# Patient Record
Sex: FEMALE | Race: BLACK OR AFRICAN AMERICAN | Employment: OTHER | URBAN - METROPOLITAN AREA
[De-identification: names, ages, dates, MRNs, and addresses within clinical notes are randomized per-mention and may not be internally consistent; named-entity substitution may affect disease eponyms.]

---

## 2017-01-11 ENCOUNTER — DOCUMENTATION ONLY (OUTPATIENT)
Dept: HEMATOLOGY | Age: 62
End: 2017-01-11

## 2017-01-11 ENCOUNTER — TELEPHONE (OUTPATIENT)
Dept: HEMATOLOGY | Age: 62
End: 2017-01-11

## 2017-01-11 NOTE — TELEPHONE ENCOUNTER
Ms. Jose M Hernandez would like to discuss her medical issues with you, she will not be at her 7:30am appointment today, due to the weather.  She would like to speak to you then she will schedule an f/up apt

## 2017-01-11 NOTE — PROGRESS NOTES
Called and informed Ms. Zia Kameronlucille that she needs to schedule a f/up apt to discuss her medical issues.

## 2017-02-08 ENCOUNTER — OFFICE VISIT (OUTPATIENT)
Dept: HEMATOLOGY | Age: 62
End: 2017-02-08

## 2017-02-08 VITALS
RESPIRATION RATE: 16 BRPM | OXYGEN SATURATION: 97 % | WEIGHT: 153 LBS | HEIGHT: 62 IN | HEART RATE: 58 BPM | SYSTOLIC BLOOD PRESSURE: 153 MMHG | TEMPERATURE: 99 F | DIASTOLIC BLOOD PRESSURE: 96 MMHG | BODY MASS INDEX: 28.16 KG/M2

## 2017-02-08 DIAGNOSIS — B18.2 CHRONIC HEPATITIS C WITHOUT HEPATIC COMA (HCC): Primary | ICD-10-CM

## 2017-02-08 DIAGNOSIS — B18.2 CHRONIC HEPATITIS C WITHOUT HEPATIC COMA (HCC): ICD-10-CM

## 2017-02-08 NOTE — MR AVS SNAPSHOT
Visit Information Date & Time Provider Department Dept. Phone Encounter #  
 2/8/2017  3:40 PM Cassidy Chapman MD [facility] [facility] [id number] Your Appointments 3/9/2017  9:00 AM  
PROCEDURE with Cassidy Chapman MD  
[facility] ([facility]) Appt Note: EGD ---- HCV  
 [address] [facility] [address]  
[phone]  
  
   
 Ruben Haley  
  
    
 3/16/2017  3:00 PM  
Follow Up with Delphine Felty, NP [facility] of [facility] ([facility]) Appt Note: HCV  
 [address] Upcoming Health Maintenance Date Due Pneumococcal 19-64 Highest Risk (1 of 3 - PCV13) 5/18/1974 DTaP/Tdap/Td series (1 - Tdap) 5/18/1976 PAP AKA CERVICAL CYTOLOGY 5/18/1976 BREAST CANCER SCRN MAMMOGRAM 5/18/2005 FOBT Q 1 YEAR AGE 50-75 5/18/2005 ZOSTER VACCINE AGE 60> 5/18/2015 INFLUENZA AGE 9 TO ADULT 8/1/2016 Allergies as of 2/8/2017  Review Complete On: 2/8/2017 By: Cora Patel Severity Noted Reaction Type Reactions Lipitor [Atorvastatin]  06/29/2016    Swelling Current Immunizations  Never Reviewed No immunizations on file. Not reviewed this visit You Were Diagnosed With   
  
 Codes Comments Chronic hepatitis C without hepatic coma (HCC)    -  Primary ICD-10-CM: B18.2 ICD-9-CM: 070.54 Vitals BP Pulse Temp Resp Height(growth percentile) Weight(growth percentile) (!) 153/96 (BP 1 Location: Left arm, BP Patient Position: Sitting) (!) 58 99 °F (37.2 °C) (Tympanic) 16 5' 2\" (1.575 m) 153 lb (69.4 kg) SpO2 BMI OB Status Smoking Status 97% 27.98 kg/m2 Menopause Current Every Day Smoker Vitals History BMI and BSA Data Body Mass Index Body Surface Area  
 27.98 kg/m 2 1.74 m 2 Your Updated Medication List  
  
   
This list is accurate as of: 2/8/17  4:21 PM.  Always use your most recent med list.  
  
  
  
  
 hydroCHLOROthiazide 25 mg tablet Commonly known as:  HYDRODIURIL Take 25 mg by mouth daily. PAXIL 10 mg tablet Generic drug:  PARoxetine Take  by mouth daily. To-Do List   
 02/08/2017 Lab:  AFP WITH AFP-L3%   
  
 02/08/2017 Lab:  CANCER AG 19-9   
  
 02/08/2017 Lab:  CBC WITH AUTOMATED DIFF   
  
 02/08/2017 Lab:  ETHYL ALCOHOL   
  
 02/08/2017 Lab:  HCV RNA BY BRONWYN QL,RFLX TO QT   
  
 02/08/2017 Lab:  HEP A AB, TOTAL   
  
 02/08/2017 Lab:  HEP B SURFACE AB   
  
 02/08/2017 Lab:  HEP B SURFACE AG   
  
 02/08/2017 Lab:  HEP C GENOTYPE   
  
 02/08/2017 Lab:  HEPATIC FUNCTION PANEL   
  
 02/08/2017 Lab:  HEPATITIS B CORE AB, TOTAL   
  
 02/08/2017 Lab:  HIV 1/2 AG/AB, 4TH GENERATION,W RFLX CONFIRM   
  
 02/08/2017 Lab:  METABOLIC PANEL, BASIC   
  
 02/08/2017 Lab:  PROTHROMBIN TIME + INR   
  
 03/10/2017 GI:  EGD   
  
 03/10/2017 Imaging:  MRI ABD W WO CONT Introducing [facility] & HEALTH SERVICES! Mariel Whitley introduces AppNeta patient portal. Now you can access parts of your medical record, email your doctor's office, and request medication refills online. 1. In your internet browser, go to [email or web address] 2. Click on the First Time User? Click Here link in the Sign In box. You will see the New Member Sign Up page. 3. Enter your AppNeta Access Code exactly as it appears below. You will not need to use this code after youve completed the sign-up process. If you do not sign up before the expiration date, you must request a new code. · AppNeta Access Code: [email or web address] Expires: 5/9/2017  4:21 PM 
 
4.  Enter the last four digits of your Social Security Number (xxxx) and Date of Birth (mm/dd/yyyy) as indicated and click Submit. You will be taken to the next sign-up page. 5. Create a Reclog ID. This will be your Reclog login ID and cannot be changed, so think of one that is secure and easy to remember. 6. Create a Reclog password. You can change your password at any time. 7. Enter your Password Reset Question and Answer. This can be used at a later time if you forget your password. 8. Enter your e-mail address. You will receive e-mail notification when new information is available in [address] 9. Click Sign Up. You can now view and download portions of your medical record. 10. Click the Download Summary menu link to download a portable copy of your medical information. If you have questions, please visit the Frequently Asked Questions section of the Reclog website. Remember, Reclog is NOT to be used for urgent needs. For medical emergencies, dial 911. Now available from your iPhone and Android! Please provide this summary of care documentation to your next provider. Your primary care clinician is listed as [employer]. If you have any questions after today's visit, please call [phone].

## 2017-02-08 NOTE — PROGRESS NOTES
93 Danie Davenport MD, Chyrel Goltz, NP [place], PA-C Rufina Hugh, MD, [address], [facility] MD Macey Frey NP Lendia Oiler, NP [facility]     [address], [address] [facility]     [facility], [address]     [phone]     FAX: [address] [place] [address] Drive, [address]     [place], [address] [place] [address] Street [address]     [phone]     FAX: [phone]         PCP: Patient Care Team:  Jj Patterson DO as PCP - General (Family Practice)    Problem List  Date Reviewed: 8/12/2016          Codes Class Noted    Cholangiocarcinoma ([address] ICD-10-CM: C22.1  ICD-9-CM: 155.1  6/29/2016        HTN (hypertension) ICD-10-CM: I10  ICD-9-CM: 401.9  6/15/2016        Depression ICD-10-CM: F32.9  ICD-9-CM: 311  6/15/2016        HCV (hepatitis C virus) ICD-10-CM: B19.20  ICD-9-CM: 070.70  6/15/2016        Primary malignant neoplasm of liver ([facility] [address] [address] ICD-10-CM: C22.8  ICD-9-CM: 155.0  6/15/2016                 Pippa Jama returns regarding cholangiocarcinoma vs mixed unspecified type liver cancer and its management. The patient is a 64y.o. year old [de-identified] female with history of hepatitis C with evidence of cirrhosis on biopsy 2/2016      This was biopsied and determined to be a poorly differentiated cholangiocarcinoma vs. Hep Par 1 negative HCC. There was also cirrhosis noted on this biopsy. . She was briefly treated with interferon over 15 years ago but did not tolerate it. Imaging via MRI  Feb 2016 demonstrated 5.2 x 2.8 cm cm right hepatic mass. The lesion was not classic for [address] In August of 2016 she had the tumor removed. Surgical margins were clear. Gallbladder was also removed. She is doing well and is ready to treat HCV. She was lost to follow up post operatively.  She then went to see a GI closer to home to be treated for HCV and apparently was denied for uncertain reason. She does not have symptoms. The patient has not developed edema. The patient has not developed hepatic encephalopathy. The patient has not had been evaluated for varices. The most recent laboratory studies indicate that the liver transaminases are elevated, they were normal months prior. The patient has no symptoms which could be attributed to the liver disorder. The patient completes all daily activities without any functional limitations. The patient has not experienced fatigvers, chills, shortness of breath, chest pain, pain in the right side over the liver, diffuse abdominal pain, nausea, vomiting, constipation, diarrhea, dry eyes, dry mouth, arthralgias, myalgias, yellowing of the eyes or skin, itching, dark urine, problems concentrating, swelling of the abdomen, swelling of the lower extremities, hematemesis, hematochezia. ALLERGIES  Allergies   Allergen Reactions    Lipitor [Atorvastatin] Swelling       MEDICATIONS  Current Outpatient Prescriptions   Medication Sig    PARoxetine (PAXIL) 10 mg tablet Take  by mouth daily.  hydrochlorothiazide (HYDRODIURIL) 25 mg tablet Take 25 mg by mouth daily. No current facility-administered medications for this visit. SYSTEM REVIEW NOT RELATED TO LIVER DISEASE OR REVIEWED ABOVE:  Constitution systems: Negative for fever, chills, weight gain, weight loss. Eyes: Negative for visual changes. ENT: Negative for sore throat, painful swallowing. Respiratory: Negative for cough, hemoptysis, SOB. Cardiology: Negative for chest pain, palpitations. GI:  Negative for constipation or diarrhea. : Negative for urinary frequency, dysuria, hematuria, nocturia. Skin: Negative for rash. Hematology: Negative for easy bruising, blood clots. Musculo-skelatal: Negative for back pain, muscle pain, weakness.   Neurologic: Negative for headaches, dizziness, vertigo, memory problems not related to HE.  Psychology: Negative for anxiety, depression. FAMILY HISTORY:  The father Passed away. No knowledge of medical histry  The mother passed from kidney disease. There is no family history of liver disease. SOCIAL HISTORY:  The patient is . The patient has 5 children. The patient smokes 3-4 cigarettes/day. The patient is retired. She does get social security. PHYSICAL EXAMINATION:  Visit Vitals    BP (!) 153/96 (BP 1 Location: Left arm, BP Patient Position: Sitting)    Pulse (!) 58    Temp 99 °F (37.2 °C) (Tympanic)    Resp 16    Ht 5' 2\" (1.575 m)    Wt 153 lb (69.4 kg)    SpO2 97%    BMI 27.98 kg/m2     General: No acute distress. Eyes: Sclera anicteric. ENT: No oral lesions. Thyroid normal.  Nodes: No adenopathy. Skin: No spider angiomata. No jaundice. No palmar erythema. Respiratory: Lungs clear to auscultation. Cardiovascular: Regular heart rate. No murmurs. No JVD. Abdomen: Soft non-tender. Right subcostal scar. Well healed. Liver size normal to percussion/palpation. Spleen not palpable. No obvious ascites. Extremities: No edema. No muscle wasting. No gross arthritic changes. Neurologic: Alert and oriented. Cranial nerves grossly intact. No asterixis.     LABORATORY STUDIES:  [facility] Ref Rng 6/15/2016 1/28/2016   WBC 3.4 - 10.8 x10E3/uL 7.9 7.8   ANC 1.4 - 7.0 x10E3/uL 4.2    HGB 11.1 - 15.9 g/dL 14.5 12.6    - 379 x10E3/uL 190 184   INR 0.8 - 1.2 1.1 0.9   AST 0 - 40 IU/L 50 (H)    ALT 0 - 32 IU/L 59 (H)    Alk Phos 39 - 117 IU/L 82    Bili, Total 0.0 - 1.2 mg/dL 0.3    Bili, Direct 0.00 - 0.40 mg/dL 0.09    Albumin 3.6 - 4.8 g/dL 4.0    BUN 8 - 27 mg/dL 12 14   Creat 0.57 - 1.00 mg/dL 1.05 (H) 0.88   Na 134 - 144 mmol/L 140 142   K 3.5 - 5.2 mmol/L 3.9 4.1   Cl 97 - 108 mmol/L 102 108   CO2 18 - 29 mmol/L 24 30   Glucose 65 - 99 mg/dL 77 88   Ammonia 11 - 32 UMOL/L 21      From 11/2015  AST/ALT/ALP/T Bili/ALB: 34/33/74/0.3/3.8  WBC/HB/PLT/INR:  BUN/CREAT:      Recent liver function panel, CBC with platelet count and BMP are not available. These studies will be performed. SEROLOGIES:  HCV RNA + GT 1a      LIVER HISTOLOGY:  02/2016 Liver biopsy POORLY DIFFERENTIATED CHOLANGIOCARCINOMA    ENDOSCOPIC PROCEDURES:  Not available or performed    RADIOLOGY:  02/2016 MRI  Inferior right hepatic lobe mass with imaging features that are not entirely  specific. This is a hypovascular lesion with intrahepatic cholangiocarcinoma and  hypovascular metastasis as primary differential possibilities. This lesion was  biopsied previously on 1/28/2016.   2. Prominent cardiophrenic lymph node as well as damari hepatic lymph nodes which  are nonspecific but potentially metastatic. OTHER TESTING:  Not available or performed    ASSESSMENT AND PLAN:    Chronic Hepatitis C cirrhosis complicated by  poorly differentiated intrahepatic cholangiocarcinoma. She is s/p hepatic wedge resection of the tumor in 8/2016. Follow up imaging has not been performed as patient was lost to follow up with me. Will repeat MRI today and follow her tumor makers. We will repeat imaging every 3 months for at least a year. Then we will extend to Q6 months. Patient does have cirrhosis mentioned on biopsy though her liver function appears well preserved. HCV treatment should be pursued now given her excellent surgical outcome. I am not clear why she was denied before. EGD for varices screening should be planned. I scheduled this. All of the above issues were discussed with the patient. All questions were answered. The patient expressed a clear understanding of the above. [facility] 87 in 4 weeks to begin HCV treatment.         Leah Villafuerte MD  [facility] [place] of [address], [address], [address]  [phone]  FAX: [phone]

## 2017-02-17 ENCOUNTER — HOSPITAL ENCOUNTER (OUTPATIENT)
Dept: LAB | Age: 62
Discharge: HOME | End: 2017-02-17

## 2017-02-17 PROCEDURE — 99001 SPECIMEN HANDLING PT-LAB: CPT | Performed by: INTERNAL MEDICINE

## 2017-02-22 ENCOUNTER — HOSPITAL ENCOUNTER (OUTPATIENT)
Age: 62
Discharge: HOME | End: 2017-02-22
Attending: INTERNAL MEDICINE
Payer: MEDICAID

## 2017-02-22 DIAGNOSIS — B18.2 CHRONIC HEPATITIS C WITHOUT HEPATIC COMA (HCC): ICD-10-CM

## 2017-02-22 LAB
AFP L3 MFR SERPL: 6.1 % (ref 0–9.9)
AFP SERPL-MCNC: 5.2 NG/ML (ref 0–8)
ALBUMIN SERPL-MCNC: 3.8 G/DL (ref 3.6–4.8)
ALP SERPL-CCNC: 88 IU/L (ref 39–117)
ALT SERPL-CCNC: 32 IU/L (ref 0–32)
AST SERPL-CCNC: 32 IU/L (ref 0–40)
BASOPHILS # BLD AUTO: 0 X10E3/UL (ref 0–0.2)
BASOPHILS NFR BLD AUTO: 0 %
BILIRUB DIRECT SERPL-MCNC: 0.11 MG/DL (ref 0–0.4)
BILIRUB SERPL-MCNC: 0.3 MG/DL (ref 0–1.2)
BUN SERPL-MCNC: 14 MG/DL (ref 8–27)
BUN/CREAT SERPL: 14 (ref 11–26)
CALCIUM SERPL-MCNC: 9.5 MG/DL (ref 8.7–10.3)
CANCER AG19-9 SERPL-ACNC: 22 U/ML (ref 0–35)
CHLORIDE SERPL-SCNC: 101 MMOL/L (ref 96–106)
CO2 SERPL-SCNC: 24 MMOL/L (ref 18–29)
CREAT SERPL-MCNC: 1.01 MG/DL (ref 0.57–1)
EOSINOPHIL # BLD AUTO: 0.1 X10E3/UL (ref 0–0.4)
EOSINOPHIL NFR BLD AUTO: 2 %
ERYTHROCYTE [DISTWIDTH] IN BLOOD BY AUTOMATED COUNT: 14 % (ref 12.3–15.4)
ETHANOL BLD GC-MCNC: NEGATIVE %
GLUCOSE SERPL-MCNC: 74 MG/DL (ref 65–99)
HAV AB SER QL IA: NEGATIVE
HBV CORE AB SERPL QL IA: POSITIVE
HBV SURFACE AB SER QL: REACTIVE
HBV SURFACE AG SERPL QL IA: NEGATIVE
HCT VFR BLD AUTO: 42.1 % (ref 34–46.6)
HCV GENTYP SERPL NAA+PROBE: NORMAL
HCV RNA SERPL NAA+PROBE-ACNC: NORMAL IU/ML
HCV RNA SERPL NAA+PROBE-LOG IU: 6.87 LOG10 IU/ML
HCV RNA SERPL QL NAA+PROBE: POSITIVE
HGB BLD-MCNC: 13.6 G/DL (ref 11.1–15.9)
HIV 1+2 AB+HIV1 P24 AG SERPL QL IA: NON REACTIVE
IMM GRANULOCYTES # BLD: 0 X10E3/UL (ref 0–0.1)
IMM GRANULOCYTES NFR BLD: 0 %
INR PPP: 1 (ref 0.8–1.2)
LYMPHOCYTES # BLD AUTO: 2.5 X10E3/UL (ref 0.7–3.1)
LYMPHOCYTES NFR BLD AUTO: 33 %
MCH RBC QN AUTO: 30.5 PG (ref 26.6–33)
MCHC RBC AUTO-ENTMCNC: 32.3 G/DL (ref 31.5–35.7)
MCV RBC AUTO: 94 FL (ref 79–97)
MONOCYTES # BLD AUTO: 0.5 X10E3/UL (ref 0.1–0.9)
MONOCYTES NFR BLD AUTO: 7 %
NEUTROPHILS # BLD AUTO: 4.4 X10E3/UL (ref 1.4–7)
NEUTROPHILS NFR BLD AUTO: 58 %
PLATELET # BLD AUTO: 200 X10E3/UL (ref 150–379)
PLEASE NOTE, 550474: NORMAL
POTASSIUM SERPL-SCNC: 3.7 MMOL/L (ref 3.5–5.2)
PROT SERPL-MCNC: 7.8 G/DL (ref 6–8.5)
PROTHROMBIN TIME: 10.5 SEC (ref 9.1–12)
RBC # BLD AUTO: 4.46 X10E6/UL (ref 3.77–5.28)
SODIUM SERPL-SCNC: 140 MMOL/L (ref 134–144)
TEST INFORMATION: NORMAL
WBC # BLD AUTO: 7.6 X10E3/UL (ref 3.4–10.8)

## 2017-02-22 PROCEDURE — 74011250636 HC RX REV CODE- 250/636: Performed by: INTERNAL MEDICINE

## 2017-02-22 PROCEDURE — A9585 GADOBUTROL INJECTION: HCPCS | Performed by: INTERNAL MEDICINE

## 2017-02-22 PROCEDURE — 74183 MRI ABD W/O CNTR FLWD CNTR: CPT

## 2017-02-22 RX ORDER — SODIUM CHLORIDE 0.9 % (FLUSH) 0.9 %
5-10 SYRINGE (ML) INJECTION
Status: DISCONTINUED | OUTPATIENT
Start: 2017-02-22 | End: 2017-02-26 | Stop reason: HOSPADM

## 2017-02-22 RX ADMIN — GADOBUTROL 10 ML: 604.72 INJECTION INTRAVENOUS at 21:00

## 2017-02-22 RX ADMIN — Medication 10 ML: at 20:59

## 2017-03-30 ENCOUNTER — HOSPITAL ENCOUNTER (OUTPATIENT)
Age: 62
Setting detail: OUTPATIENT SURGERY
Discharge: HOME | End: 2017-03-30
Attending: INTERNAL MEDICINE | Admitting: INTERNAL MEDICINE
Payer: MEDICAID

## 2017-03-30 VITALS
SYSTOLIC BLOOD PRESSURE: 156 MMHG | OXYGEN SATURATION: 100 % | WEIGHT: 158 LBS | HEIGHT: 62 IN | TEMPERATURE: 96.9 F | HEART RATE: 62 BPM | RESPIRATION RATE: 18 BRPM | BODY MASS INDEX: 29.08 KG/M2 | DIASTOLIC BLOOD PRESSURE: 95 MMHG

## 2017-03-30 PROCEDURE — 77030009426 HC FCPS BIOP ENDOSC BSC -B: Performed by: INTERNAL MEDICINE

## 2017-03-30 PROCEDURE — 74011000250 HC RX REV CODE- 250: Performed by: INTERNAL MEDICINE

## 2017-03-30 PROCEDURE — 74011250636 HC RX REV CODE- 250/636

## 2017-03-30 PROCEDURE — 74011250636 HC RX REV CODE- 250/636: Performed by: INTERNAL MEDICINE

## 2017-03-30 PROCEDURE — 76040000019: Performed by: INTERNAL MEDICINE

## 2017-03-30 PROCEDURE — 88305 TISSUE EXAM BY PATHOLOGIST: CPT | Performed by: INTERNAL MEDICINE

## 2017-03-30 RX ORDER — MIDAZOLAM HYDROCHLORIDE 1 MG/ML
.25-5 INJECTION, SOLUTION INTRAMUSCULAR; INTRAVENOUS
Status: ACTIVE | OUTPATIENT
Start: 2017-03-30 | End: 2017-03-30

## 2017-03-30 RX ORDER — SODIUM CHLORIDE 0.9 % (FLUSH) 0.9 %
5-10 SYRINGE (ML) INJECTION AS NEEDED
Status: DISPENSED | OUTPATIENT
Start: 2017-03-30 | End: 2017-03-30

## 2017-03-30 RX ORDER — SODIUM CHLORIDE 0.9 % (FLUSH) 0.9 %
5-10 SYRINGE (ML) INJECTION EVERY 8 HOURS
Status: DISPENSED | OUTPATIENT
Start: 2017-03-30 | End: 2017-03-30

## 2017-03-30 RX ORDER — DEXTROMETHORPHAN/PSEUDOEPHED 2.5-7.5/.8
1.2 DROPS ORAL
Status: CANCELLED | OUTPATIENT
Start: 2017-03-30

## 2017-03-30 RX ORDER — FLUMAZENIL 0.1 MG/ML
0.2 INJECTION INTRAVENOUS
Status: ACTIVE | OUTPATIENT
Start: 2017-03-30 | End: 2017-03-30

## 2017-03-30 RX ORDER — DIPHENHYDRAMINE HYDROCHLORIDE 50 MG/ML
50 INJECTION, SOLUTION INTRAMUSCULAR; INTRAVENOUS ONCE
Status: CANCELLED | OUTPATIENT
Start: 2017-03-30 | End: 2017-03-30

## 2017-03-30 RX ORDER — SODIUM CHLORIDE 9 MG/ML
125 INJECTION, SOLUTION INTRAVENOUS CONTINUOUS
Status: DISPENSED | OUTPATIENT
Start: 2017-03-30 | End: 2017-03-30

## 2017-03-30 RX ORDER — NALOXONE HYDROCHLORIDE 0.4 MG/ML
0.4 INJECTION, SOLUTION INTRAMUSCULAR; INTRAVENOUS; SUBCUTANEOUS
Status: ACTIVE | OUTPATIENT
Start: 2017-03-30 | End: 2017-03-30

## 2017-03-30 RX ORDER — EPINEPHRINE 0.1 MG/ML
1 INJECTION INTRACARDIAC; INTRAVENOUS
Status: CANCELLED | OUTPATIENT
Start: 2017-03-30 | End: 2017-03-30

## 2017-03-30 RX ORDER — FENTANYL CITRATE 50 UG/ML
100 INJECTION, SOLUTION INTRAMUSCULAR; INTRAVENOUS
Status: ACTIVE | OUTPATIENT
Start: 2017-03-30 | End: 2017-03-30

## 2017-03-30 RX ORDER — ATROPINE SULFATE 0.1 MG/ML
0.5 INJECTION INTRAVENOUS
Status: CANCELLED | OUTPATIENT
Start: 2017-03-30 | End: 2017-03-30

## 2017-03-30 RX ORDER — LANOLIN ALCOHOL/MO/W.PET/CERES
3 CREAM (GRAM) TOPICAL
COMMUNITY
End: 2017-04-20

## 2017-03-30 RX ADMIN — SODIUM CHLORIDE 125 ML/HR: 900 INJECTION, SOLUTION INTRAVENOUS at 09:42

## 2017-03-30 NOTE — PROCEDURES
UPPER ENDOSCOPY PROCEDURE NOTE    Feliz Sykes  1955    INDICATION: Cirrhosis. Screening for esophageal varices with variceal ligation if  indicated. : Denton Marie MD    ASSISTANT: NONE    ANESTHESIA/SEDATION: Versed 3.5 mg and Fentanyl 75 mcg    PROCEDURE DESCRIPTION:  Infomed consent was obtained from the patient for the procedure. All risks and benefits of the procedure explained. The patient was taken to the endoscopy suite and placed in the left lateral decubitus position. Following sequential administration of sedation to doses as indicated above the endoscope was inserted into the mouth and advanced under direct vision to the second portion of the duodenum. Careful inspection of upper gastrointestinal tract was made as the endoscope was inserted and withdrawn. Retroflexion of the endoscope to view of the cardia of the stomach was performed. The findings and interventions are described below. FINDINGS:  Esophagus:  Normal.      Stomach: Mild gastritis. This was biopsied at random. No gastric varices identified. Duodenum: Normal bulb and second portion    INTERVENTION: Biopsies of stomach taken. COMPLICATIONS: None. The patient tolerated the procedure well. EBL: Negligible. RECOMMENDATIONS:  Observe until discharge parameters are achieved. May resume previous diet. Repeat endoscopy to reassess varices and need for banding in 2 years. Follow-up Liver [facility] office as scheduled.       Denton Marie MD  3/30/2017  10:40 AM

## 2017-03-30 NOTE — IP AVS SNAPSHOT
[address] [place] [address] 
[phone] Patient: Arturo Varghese MRN: [id number] RFZ:1/69/[id number] You are allergic to the following Allergen Reactions Lipitor (Atorvastatin) Swelling Recent Documentation Height Weight Breastfeeding? BMI OB Status Smoking Status 1.575 m 71.7 kg No 28.9 kg/m2 Menopause Current Every Day Smoker Emergency Contacts Name Discharge Info Relation Home Work Mobile [facility] DISCHARGE CAREGIVER [3] Spouse [3] [phone] About your hospitalization You were admitted on:  March 30, 2017 You last received care in the:  [facility] ENDOSCOPY You were discharged on:  March 30, 2017 Unit phone number:  [phone] Why you were hospitalized Your primary diagnosis was:  Not on File Providers Seen During Your Hospitalizations Provider Role Specialty Primary office phone Micky Saunders MD Attending Provider Gastroenterology [phone] Your Primary Care Physician (PCP) Primary Care Physician Office Phone Office Fax Ming Gutierres [phone] Follow-up Information Follow up With Details Comments Contact Info Urszula Santiago, 7[address] [facility] 207 200 [facility] 
[phone] Micky Saunders MD  as intructed. 217 [facility] [address] 
[phone] Your Appointments Thursday April 20, 2017  2:00 PM EDT Follow Up with Beti Lizama NP [facility] ([facility])  
 [address] [place] [address]  
[phone] Current Discharge Medication List  
  
CONTINUE these medications which have NOT CHANGED Dose & Instructions Dispensing Information Comments Morning Noon Evening Bedtime  
 hydroCHLOROthiazide 25 mg tablet Commonly known as:  HYDRODIURIL Your last dose was: Your next dose is:    
   
   
 Dose:  25 mg Take 25 mg by mouth daily. Refills:  0  
     
   
   
   
  
 melatonin 3 mg tablet Your last dose was: Your next dose is:    
   
   
 Dose:  3 mg Take 3 mg by mouth. Refills:  0 PAXIL 10 mg tablet Generic drug:  PARoxetine Your last dose was: Your next dose is: Take  by mouth daily. Refills:  0 Discharge Instructions [address] Aide Recinos MD, SAGE Vicente PA-C Tylene Port, MD, MD Macey Clinton NP Kimberlee Poe, NP [address] [place] [address], [address] [place]. 
   [phone] FAX: [address], [address] [facility], [address] 
   [phone] FAX: [phone] ENDOSCOPY DISCHARGE INSTRUCTIONS [place] 1955 Date: 3/30/2017 DISCOMFORT: 
Use lozenges or warm salt water gargle for sore thoat Apply warm compress to IV site if red. If redness or soreness persists call the office. You may experience gas and bloating. Walking and belching will help relieve this. You may experience chest discomfort or pressure especially if banding of esophageal varices was performed. DIET: 
You may resume your normal diet. ACTIVITY: 
Spend the remainder of the day resting. Avoid any strenuous activity. You may not operate a vehicle for 12 hours. You may not engage in an occupation involving machinery or appliances for rest of today. Avoid making any critical or financial decisions for at least 24 hour. Call the [employer] [phone] if you have any of the following: Increasing chest or abdominal pain, nausea, vomiting, vomiting blood, abdominal distension or swelling, fever or chills, bloody discharge from nose or mouth or shortness of breath. Follow-up Instructions: 
Call Dr. Marco Bailon for any questions or problems at the phone number listed above. If a biopsy was performed, you will be contacted by the office staff or Dr Marco Bailon within 1 week. If you have not heard from us by then you may call the office at the phone number listed above to inquire about the results. ENDOSCOPY FINDINGS: 
Your endoscopy demonstrated mild inflammation of the stomach. This was biopsied. DISCHARGE SUMMARY from the Nurse: The following personal items collected during your admission are returned to you:  
Dental Appliance: Dental Appliances: None Vision: Visual Aid: Glasses Hearing Aid:   
Jewelry:   
Clothing:   
Other Valuables:   
Valuables sent to safe:   
 
DISCHARGE SUMMARY from Nurse The following personal items are in your possession at time of discharge: 
 
Dental Appliances: None Visual Aid: Glasses PATIENT INSTRUCTIONS: 
 
 
F-face looks uneven A-arms unable to move or move unevenly S-speech slurred or non-existent T-time-call 911 as soon as signs and symptoms begin-DO NOT go Back to bed or wait to see if you get better-TIME IS BRAIN. Warning Signs of HEART ATTACK Call 911 if you have these symptoms: 
? Chest discomfort. Most heart attacks involve discomfort in the center of the chest that lasts more than a few minutes, or that goes away and comes back. It can feel like uncomfortable pressure, squeezing, fullness, or pain. ? Discomfort in other areas of the upper body. Symptoms can include pain or discomfort in one or both arms, the back, neck, jaw, or stomach. ? Shortness of breath with or without chest discomfort. ? Other signs may include breaking out in a cold sweat, nausea, or lightheadedness. Don't wait more than five minutes to call [address]! Fast action can save your life. Calling 911 is almost always the fastest way to get lifesaving treatment. Emergency Medical Services staff can begin treatment when they arrive  up to an hour sooner than if someone gets to the hospital by car. The discharge information has been reviewed with the patient and spouse. The patient and spouse verbalized understanding. Discharge medications reviewed with the patient and spouse and appropriate educational materials and side effects teaching were provided. Patient armband removed and shredded. Discharge Orders None Introducing \[facility]\"" & HEALTH SERVICES! Jorge Bonilla introduces Intervolve patient portal. Now you can access parts of your medical record, email your doctor's office, and request medication refills online. 1. In your internet browser, go to [email or web address] 2. Click on the First Time User? Click Here link in the Sign In box. You will see the New Member Sign Up page. 3. Enter your Intervolve Access Code exactly as it appears below. You will not need to use this code after youve completed the sign-up process. If you do not sign up before the expiration date, you must request a new code. · Intervolve Access Code: [email or web address] Expires: 5/9/2017  5:21 PM 
 
4. Enter the last four digits of your Social Security Number (xxxx) and Date of Birth (mm/dd/yyyy) as indicated and click Submit. You will be taken to the next sign-up page. 5. Create a DWNLDt ID. This will be your Intervolve login ID and cannot be changed, so think of one that is secure and easy to remember. 6. Create a Intervolve password. You can change your password at any time. 7. Enter your Password Reset Question and Answer.  This can be used at a later time if you forget your password. 8. Enter your e-mail address. You will receive e-mail notification when new information is available in [address] 9. Click Sign Up. You can now view and download portions of your medical record. 10. Click the Download Summary menu link to download a portable copy of your medical information. If you have questions, please visit the Frequently Asked Questions section of the Kewego website. Remember, Kewego is NOT to be used for urgent needs. For medical emergencies, dial 911. Now available from your iPhone and Android! General Information Please provide this summary of care documentation to your next provider. Patient Signature:  ____________________________________________________________ Date:  ____________________________________________________________  
  
Selvin Breath Provider Signature:  ____________________________________________________________ Date:  ____________________________________________________________

## 2017-03-30 NOTE — H&P
PRE-PROCEDURE NOTE - EGD    H and P from last office visit reviewed. Allergies reviewed. Out-patient medicaton list reviewed. Patient Active Problem List   Diagnosis Code    HTN (hypertension) I10    Depression F32.9    HCV (hepatitis C virus) B19.20    Primary malignant neoplasm of liver ([facility] C22.8    Cholangiocarcinoma ([facility] C22.1       EGD to assess for esophageal varices. The risks of the procedure were discussed with the patient. These included reaction to anesthesia, pain, perforation and bleeding. All questions were answered. The patient wishes to proceed with the procedure. PHYSICAL EXAMINATION:  VS: per nursing note  General: No acute distress. Eyes: Sclera anicteric. ENT: No oral lesions. Thyroid normal.  Nodes: No adenopathy. Skin: No spider angiomata. No jaundice. No palmar erythema. Respiratory: Lungs clear to auscultation. Cardiovascular: Regular heart rate. No murmurs. No JVD. Abdomen: Soft non-tender, liver size normal to percussion/palpation. Spleen not palpable. No obvious ascites. Extremities: No edema. No muscle wasting. No gross arthritic changes. Neurologic: Alert and oriented. Cranial nerves grossly intact. No asterixis. MOST RECENT LABORATORY STUDIES:  Lab Results   Component Value Date/Time    WBC 7.6 02/17/2017 12:00 AM    Hemoglobin (POC) 15.6 08/12/2016 11:09 AM    HGB 13.6 02/17/2017 12:00 AM    Hematocrit (POC) 46 08/12/2016 11:09 AM    HCT 42.1 02/17/2017 12:00 AM    PLATELET 352 66/88/9715 12:00 AM    MCV 94 02/17/2017 12:00 AM     Lab Results   Component Value Date/Time    INR 1.0 02/17/2017 12:00 AM    INR 1.1 06/15/2016 04:17 PM    INR 0.9 01/28/2016 07:40 AM    Prothrombin time 10.5 02/17/2017 12:00 AM    Prothrombin time 11.1 06/15/2016 04:17 PM    Prothrombin time 12.4 01/28/2016 07:40 AM       ASSESSMENT AND PLAN:  EGD to assess for esophageal varices. Conscious sedation with fentanyl and versed.           Guillermo Posey MD  Liver [place] of [address], [address], [address]  [phone]

## 2017-03-30 NOTE — DISCHARGE INSTRUCTIONS
93 Danie Davenport MD, Gian Reese, [facility]     April Juan Gaxiola, FANNY Go MD, MD Macey Gill, SAGE De Santiago, SAGE        [address] [facility], [address] Madi Sherman  22.     [phone]     FAX: [address], [address] [facility], [address]     [phone]     FAX: [phone]           ENDOSCOPY DISCHARGE INSTRUCTIONS      Estrella Zaldivar  1955  Date: 3/30/2017    DISCOMFORT:  Use lozenges or warm salt water gargle for sore thoat  Apply warm compress to IV site if red. If redness or soreness persists call the office. You may experience gas and bloating. Walking and belching will help relieve this. You may experience chest discomfort or pressure especially if banding of esophageal varices was performed. DIET:  You may resume your normal diet. ACTIVITY:  Spend the remainder of the day resting. Avoid any strenuous activity. You may not operate a vehicle for 12 hours. You may not engage in an occupation involving machinery or appliances for rest of today. Avoid making any critical or financial decisions for at least 24 hour. Call the [address] [facility] if you have any of the following:  Increasing chest or abdominal pain, nausea, vomiting, vomiting blood, abdominal distension or swelling, fever or chills, bloody discharge from nose or mouth or shortness of breath. Follow-up Instructions:  Call Dr. Catie Lim for any questions or problems at the phone number listed above. If a biopsy was performed, you will be contacted by the office staff or Dr Catie Lim within 1 week. If you have not heard from us by then you may call the office at the phone number listed above to inquire about the results.     ENDOSCOPY FINDINGS:  Your endoscopy demonstrated mild inflammation of the stomach. This was biopsied. DISCHARGE SUMMARY from the Nurse: The following personal items collected during your admission are returned to you:   Dental Appliance: Dental Appliances: None  Vision: Visual Aid: Glasses  Hearing Aid:    Jewelry:    Clothing:    Other Valuables:    Valuables sent to safe:      DISCHARGE SUMMARY from Nurse    The following personal items are in your possession at time of discharge:    Dental Appliances: None  Visual Aid: Glasses                            PATIENT INSTRUCTIONS:    After general anesthesia or intravenous sedation, for 24 hours or while taking prescription Narcotics:  · Limit your activities  · Do not drive and operate hazardous machinery  · Do not make important personal or business decisions  · Do  not drink alcoholic beverages  · If you have not urinated within 8 hours after discharge, please contact your surgeon on call. Report the following to your surgeon:  · Excessive pain, swelling, redness or odor of or around the surgical area  · Temperature over 100.5  · Nausea and vomiting lasting longer than 4 hours or if unable to take medications  · Any signs of decreased circulation or nerve impairment to extremity: change in color, persistent  numbness, tingling, coldness or increase pain  · Any questions        What to do at Home:  Recommended activity: Activity as tolerated and no driving for today. *  Please give a list of your current medications to your Primary Care Provider. *  Please update this list whenever your medications are discontinued, doses are      changed, or new medications (including over-the-counter products) are added. *  Please carry medication information at all times in case of emergency situations.           These are general instructions for a healthy lifestyle:    No smoking/ No tobacco products/ Avoid exposure to second hand smoke    Surgeon General's Warning:  Quitting smoking now greatly reduces serious risk to your health. Obesity, smoking, and sedentary lifestyle greatly increases your risk for illness    A healthy diet, regular physical exercise & weight monitoring are important for maintaining a healthy lifestyle    You may be retaining fluid if you have a history of heart failure or if you experience any of the following symptoms:  Weight gain of 3 pounds or more overnight or 5 pounds in a week, increased swelling in our hands or feet or shortness of breath while lying flat in bed. Please call your doctor as soon as you notice any of these symptoms; do not wait until your next office visit. Recognize signs and symptoms of STROKE:    F-face looks uneven    A-arms unable to move or move unevenly    S-speech slurred or non-existent    T-time-call 911 as soon as signs and symptoms begin-DO NOT go       Back to bed or wait to see if you get better-TIME IS BRAIN. Warning Signs of HEART ATTACK     Call 911 if you have these symptoms:   Chest discomfort. Most heart attacks involve discomfort in the center of the chest that lasts more than a few minutes, or that goes away and comes back. It can feel like uncomfortable pressure, squeezing, fullness, or pain.  Discomfort in other areas of the upper body. Symptoms can include pain or discomfort in one or both arms, the back, neck, jaw, or stomach.  Shortness of breath with or without chest discomfort.  Other signs may include breaking out in a cold sweat, nausea, or lightheadedness. Don't wait more than five minutes to call 911 - MINUTES MATTER! Fast action can save your life. Calling 911 is almost always the fastest way to get lifesaving treatment. Emergency Medical Services staff can begin treatment when they arrive -- up to an hour sooner than if someone gets to the hospital by car. The discharge information has been reviewed with the patient and spouse. The patient and spouse verbalized understanding.     Discharge medications reviewed with the patient and spouse and appropriate educational materials and side effects teaching were provided. Patient armband removed and shredded.

## 2017-04-20 ENCOUNTER — TELEPHONE (OUTPATIENT)
Dept: HEMATOLOGY | Age: 62
End: 2017-04-20

## 2017-04-20 ENCOUNTER — OFFICE VISIT (OUTPATIENT)
Dept: HEMATOLOGY | Age: 62
End: 2017-04-20

## 2017-04-20 VITALS
WEIGHT: 152 LBS | DIASTOLIC BLOOD PRESSURE: 87 MMHG | BODY MASS INDEX: 27.97 KG/M2 | HEIGHT: 62 IN | TEMPERATURE: 98.2 F | OXYGEN SATURATION: 96 % | RESPIRATION RATE: 16 BRPM | SYSTOLIC BLOOD PRESSURE: 144 MMHG | HEART RATE: 77 BPM

## 2017-04-20 DIAGNOSIS — K74.60 CIRRHOSIS OF LIVER WITHOUT ASCITES, UNSPECIFIED HEPATIC CIRRHOSIS TYPE (HCC): ICD-10-CM

## 2017-04-20 DIAGNOSIS — B18.2 CHRONIC HEPATITIS C WITHOUT HEPATIC COMA (HCC): Primary | Chronic | ICD-10-CM

## 2017-04-20 DIAGNOSIS — B18.2 CHRONIC HEPATITIS C WITHOUT HEPATIC COMA (HCC): Chronic | ICD-10-CM

## 2017-04-20 RX ORDER — CYCLOBENZAPRINE HCL 5 MG
5 TABLET ORAL
COMMUNITY
End: 2019-03-25

## 2017-04-20 NOTE — MR AVS SNAPSHOT
Visit Information Date & Time Provider Department Dept. Phone Encounter #  
 4/20/2017  2:00 PM Dalia Molina NP [facility] [place] of [address] [place] [address] [id number] Follow-up Instructions Return in about 3 months (around 7/20/2017). Your Appointments 8/3/2017  2:30 PM  
Follow Up with Dalia Molina NP [facility] [place] of Grisel Caceres ([address]) Appt Note: HCV  
 [address] [facility] [place] [address] [place] [address] [place] [address] Upcoming Health Maintenance Date Due Pneumococcal 19-64 Highest Risk (1 of 3 - PCV13) 5/18/1974 DTaP/Tdap/Td series (1 - Tdap) 5/18/1976 PAP AKA CERVICAL CYTOLOGY 5/18/1976 BREAST CANCER SCRN MAMMOGRAM 5/18/2005 FOBT Q 1 YEAR AGE 50-75 5/18/2005 ZOSTER VACCINE AGE 60> 5/18/2015 INFLUENZA AGE 9 TO ADULT 8/1/2016 Allergies as of 4/20/2017  Review Complete On: 4/20/2017 By: Jean-Paul Luis Severity Noted Reaction Type Reactions Lipitor [Atorvastatin]  06/29/2016    Swelling Current Immunizations  Never Reviewed No immunizations on file. Not reviewed this visit You Were Diagnosed With   
  
 Codes Comments Chronic hepatitis C without hepatic coma (HCC)    -  Primary ICD-10-CM: B18.2 ICD-9-CM: 070.54 Cirrhosis of liver without ascites, unspecified hepatic cirrhosis type ([facility]     ICD-10-CM: K74.60 ICD-9-CM: 571.5 Vitals BP Pulse Temp Resp Height(growth percentile) 144/87 (BP 1 Location: Left arm, BP Patient Position: At rest) 77 98.2 °F (36.8 °C) (Tympanic) 16 5' 2\" (1.575 m) Weight(growth percentile) SpO2 BMI OB Status Smoking Status 152 lb (68.9 kg) 96% 27.8 kg/m2 Menopause Current Every Day Smoker Vitals History BMI and BSA Data Body Mass Index Body Surface Area  
 27.8 kg/m 2 1.74 m 2 Your Updated Medication List  
  
   
 This list is accurate as of: 4/20/17  3:00 PM.  Always use your most recent med list.  
  
  
  
  
 cyclobenzaprine 5 mg tablet Commonly known as:  FLEXERIL Take 5 mg by mouth. hydroCHLOROthiazide 25 mg tablet Commonly known as:  HYDRODIURIL Take 25 mg by mouth daily. PAXIL 10 mg tablet Generic drug:  PARoxetine Take  by mouth daily. Follow-up Instructions Return in about 3 months (around 7/20/2017). To-Do List   
 04/20/2017 Lab:  7-DRUG SCREEN, WHOLE BLD   
  
 04/20/2017 Lab:  AFP WITH AFP-L3% 04/20/2017 Lab:  CANCER AG 19-9   
  
 04/20/2017 Lab:  CBC WITH AUTOMATED DIFF   
  
 04/20/2017 Lab:  ETHYL ALCOHOL   
  
 04/20/2017 Lab:  HCV NS5A DRUG RESISTANCE ASSAY   
  
 04/20/2017 Lab:  HCV, QT WITH GRAPH   
  
 04/20/2017 Lab:  HEPATIC FUNCTION PANEL   
  
 04/20/2017 Lab:  METABOLIC PANEL, BASIC   
  
 04/20/2017 Lab:  PROTHROMBIN TIME + INR   
  
 05/20/2017 Imaging:  MRI ABD W WO CONT Referral Information Referral ID Referred By Referred To  
  
 [id number] Phil[facility] Not Available Visits Status Start Date End Date 1 New Request 4/20/17 4/20/18 If your referral has a status of pending review or denied, additional information will be sent to support the outcome of this decision. Introducing [facility]! Philip Roberts introduces [employer] patient portal. Now you can access parts of your medical record, email your doctor's office, and request medication refills online. 1. In your internet browser, go to [email or web address] 2. Click on the First Time User? Click Here link in the Sign In box. You will see the New Member Sign Up page. 3. Enter your Deezer Access Code exactly as it appears below. You will not need to use this code after youve completed the sign-up process. If you do not sign up before the expiration date, you must request a new code. · [employer] Access Code: [email or web address] Expires: 5/9/2017  5:21 PM 
 
4. Enter the last four digits of your Social Security Number (xxxx) and Date of Birth (mm/dd/yyyy) as indicated and click Submit. You will be taken to the next sign-up page. 5. Create a [employer] ID. This will be your [employer] login ID and cannot be changed, so think of one that is secure and easy to remember. 6. Create a [employer] password. You can change your password at any time. 7. Enter your Password Reset Question and Answer. This can be used at a later time if you forget your password. 8. Enter your e-mail address. You will receive e-mail notification when new information is available in [address] 9. Click Sign Up. You can now view and download portions of your medical record. 10. Click the Download Summary menu link to download a portable copy of your medical information. If you have questions, please visit the Frequently Asked Questions section of the [employer] website. Remember, [employer] is NOT to be used for urgent needs. For medical emergencies, dial 911. Now available from your iPhone and Android! Please provide this summary of care documentation to your next provider. Your primary care clinician is listed as [employer]. If you have any questions after today's visit, please call [phone].

## 2017-04-20 NOTE — PROGRESS NOTES
93 Danie Davenport MD, SAGE Burdick PA-C Nada Finger, MD, [address], MD Macey Morley NP Wray Ogle, NP        [address], [address] Madi Sherman  22.     [phone]     FAX: 140 [facility], [address]     [facility], [address]     [phone]     FAX: [phone]         PCP: Patient Care Team:  Gin Palacio DO as PCP - General (Family Practice)    Problem List  Date Reviewed: 4/20/2017          Codes Class Noted    Cholangiocarcinoma [facility]) (Chronic) ICD-10-CM: C22.1  ICD-9-CM: 155.1  6/29/2016        HTN (hypertension) ICD-10-CM: I10  ICD-9-CM: 401.9  6/15/2016        Depression ICD-10-CM: F32.9  ICD-9-CM: 701  6/15/2016        HCV (hepatitis C virus) (Chronic) ICD-10-CM: B19.20  ICD-9-CM: 070.70  6/15/2016        Primary malignant neoplasm of liver ([address] ICD-10-CM: C22.8  ICD-9-CM: 155.0  6/15/2016                 Rm Michelleork returns regarding cholangiocarcinoma vs mixed unspecified type liver cancer and its management. The patient is a 64y.o. year old [de-identified] female with history of hepatitis C with evidence of cirrhosis on biopsy 2/2016      This was biopsied and determined to be a poorly differentiated cholangiocarcinoma vs. Hep Par 1 negative HCC. There was also cirrhosis noted on this biopsy. . She was briefly treated with interferon over 15 years ago but did not tolerate it. Imaging via MRI  Feb 2016 demonstrated 5.2 x 2.8 cm cm right hepatic mass. The lesion was not classic for [address] In August of 2016 she had the tumor removed. Surgical margins were clear. Gallbladder was also removed. She is doing well and is ready to treat HCV. She was lost to follow up post operatively.  She then went to see a GI closer to home to be treated for HCV and apparently was denied for uncertain reason. She does not have symptoms. The patient has not developed edema. The patient has not developed hepatic encephalopathy. The patient has not had been evaluated for varices. The most recent laboratory studies indicate that the liver transaminases are elevated, they were normal months prior. The patient has no symptoms which could be attributed to the liver disorder. The patient completes all daily activities without any functional limitations. The patient has not experienced fatigvers, chills, shortness of breath, chest pain, pain in the right side over the liver, diffuse abdominal pain, nausea, vomiting, constipation, diarrhea, dry eyes, dry mouth, arthralgias, myalgias, yellowing of the eyes or skin, itching, dark urine, problems concentrating, swelling of the abdomen, swelling of the lower extremities, hematemesis, hematochezia. ALLERGIES  Allergies   Allergen Reactions    Lipitor [Atorvastatin] Swelling       MEDICATIONS  Current Outpatient Prescriptions   Medication Sig    cyclobenzaprine (FLEXERIL) 5 mg tablet Take 5 mg by mouth.  PARoxetine (PAXIL) 10 mg tablet Take  by mouth daily.  hydrochlorothiazide (HYDRODIURIL) 25 mg tablet Take 25 mg by mouth daily. No current facility-administered medications for this visit. SYSTEM REVIEW NOT RELATED TO LIVER DISEASE OR REVIEWED ABOVE:  Constitution systems: Negative for fever, chills, weight gain, weight loss. Eyes: Negative for visual changes. ENT: Negative for sore throat, painful swallowing. Respiratory: Negative for cough, hemoptysis, SOB. Cardiology: Negative for chest pain, palpitations. GI:  Negative for constipation or diarrhea. : Negative for urinary frequency, dysuria, hematuria, nocturia. Skin: Negative for rash. Hematology: Negative for easy bruising, blood clots. Musculo-skelatal: Negative for back pain, muscle pain, weakness.   Neurologic: Negative for headaches, dizziness, vertigo, memory problems not related to HE. Psychology: Negative for anxiety, depression. FAMILY HISTORY:  The father Passed away. No knowledge of medical histry  The mother passed from kidney disease. There is no family history of liver disease. SOCIAL HISTORY:  The patient is . The patient has 5 children. The patient smokes 3-4 cigarettes/day. The patient is retired. She does get social security. PHYSICAL EXAMINATION:  Visit Vitals    /87 (BP 1 Location: Left arm, BP Patient Position: At rest)    Pulse 77    Temp 98.2 °F (36.8 °C) (Tympanic)    Resp 16    Ht 5' 2\" (1.575 m)    Wt 152 lb (68.9 kg)    SpO2 96%    BMI 27.8 kg/m2     General: No acute distress. Eyes: Sclera anicteric. ENT: No oral lesions. Thyroid normal.  Nodes: No adenopathy. Skin: No spider angiomata. No jaundice. No palmar erythema. Respiratory: Lungs clear to auscultation. Cardiovascular: Regular heart rate. No murmurs. No JVD. Abdomen: Soft non-tender. Right subcostal scar. Well healed. Liver size normal to percussion/palpation. Spleen not palpable. No obvious ascites. Extremities: No edema. No muscle wasting. No gross arthritic changes. Neurologic: Alert and oriented. Cranial nerves grossly intact. No asterixis.     LABORATORY STUDIES:  Liver [facility] Ref Rng & Units 2/17/2017 8/12/2016 6/15/2016   WBC 3.4 - 10.8 x10E3/uL 7.6  7.9   ANC 1.4 - 7.0 x10E3/uL 4.4  4.2   HGB 11.1 - 15.9 g/dL 13.6  14.5   HGB 12 - 16 G/DL  15.6    HGB (iSTAT) 12 - 16 G/DL  15.6     - 379 x10E3/uL 200  190   INR 0.8 - 1.2 1.0  1.1   AST 0 - 40 IU/L 32  50 (H)   ALT 0 - 32 IU/L 32  59 (H)   Alk Phos 39 - 117 IU/L 88  82   Bili, Total 0.0 - 1.2 mg/dL 0.3  0.3   Bili, Direct 0.00 - 0.40 mg/dL 0.11  0.09   Albumin 3.6 - 4.8 g/dL 3.8  4.0   BUN 8 - 27 mg/dL 14  12   BUN (iSTAT) 7 - 18 MG/DL  13    Creat 0.57 - 1.00 mg/dL 1.01 (H)  1.05 (H)   Na 134 - 144 mmol/L 140  140   K 3.5 - 5.2 mmol/L 3.7  3.9   Cl 96 - 106 mmol/L 101  102   CO2 18 - 29 mmol/L 24  24   Glucose 65 - 99 mg/dL 74  77   Ammonia 11 - 32 UMOL/L   21     Cancer Screening Latest Ref Rng & Units 2/17/2017  6/15/2016   AFP, Serum 0.0 - 8.0 ng/mL 5.2  571.5 (H)   AFP-L3% 0.0 - 9.9 % 6.1  86.4 (H)   CA 19-9 0 - 35 U/mL 22  24   CEA 0.0 - 4.7 ng/mL   2.9     SEROLOGIES:  Serologies Latest Ref Rng & Units 2/17/2017   Hep A Ab, Total Negative Negative   Hep B Surface Ag Negative Negative   Hep B Core Ab, Total Negative Positive (A)   Hep B Surface AB QL  Reactive   Hep C Genotype  1a   HCV RT-PCR, Quant IU/mL [id number]     LIVER HISTOLOGY:  02/2016 Liver biopsy POORLY DIFFERENTIATED CHOLANGIOCARCINOMA. Positive for cirrhosis. Slides not reviewed by Dr. Yu Vyas. ENDOSCOPIC PROCEDURES:  03/2017. EGD by Dr. Shlomo Moore. Esophagus is normal.  pathology is negative. Repeat in 03/2019. RADIOLOGY:  02/2016 MRI  Inferior right hepatic lobe mass with imaging features that are not entirely  specific. This is a hypovascular lesion with intrahepatic cholangiocarcinoma and  hypovascular metastasis as primary differential possibilities. This lesion was  biopsied previously on 1/28/2016.   2. Prominent cardiophrenic lymph node as well as damari hepatic lymph nodes which  are nonspecific but potentially metastatic. 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 The most recent laboratory studies indicate that the liver transaminases are normal, alkaline phosphatase is normal, tests of hepatic synthetic and metabolic function are   normal, and the platelet count is normal.  Will perform laboratory testing to monitor liver function and degree of liver injury.  This will include hepatic panel, a CBC w/ diff, a BMP, a PT/INR, an AFP-L3%, drug and alcohol screening, an NS5A drug resistance assay, and HCV RNA. Complications of cirrhosis were discussed in detail. We discussed thrombocytopenia, portal hypertension, varices, GI bleeding, peripheral edema, ascites, hepatic encephalopathy, and hepatocellular carcinoma. We discussed the need for follow ups on a regular basis, at 3 month intervals to monitor for complications. We discussed the need for every 6 month liver imaging studies. She is s/p hepatic wedge resection of the tumor in 8/2016. Follow up imaging mehta recently been performed and no residual or new enhancing mass lesion was found. Repeat MRI was ordered today as well as tumor makers. We will repeat imaging every 3 months for at least a year. Then we will extend to Q6 months. Patient does have cirrhosis mentioned on biopsy though her liver function appears well preserved. HCV treatment should be pursued now given her excellent surgical outcome. I am not clear why she was denied before. We discussed treatment options in detail. One treatment option is Zepatier, a combination medication utilizing elbasvir (an NS5A inhibitor) and grazoprevir (an NS3/4A protease inhibitor), either with or without ribavirin for either 12 or 16 weeks depending on the patients NS5A polymorphism status. I explained that I will order this regime for her once she has the ordered labs completed. She refuses to have her labs drawn today. EGD was recently performed and her she has a normal esophagus. This needs to be repeated in two years. All of the above issues were discussed with the patient. All questions were answered. The patient expressed a clear understanding of the above. 40 minutes total time spent with this patient with more than 50% of this time spent counseling and coordinating care as described above. [address] [address] in 3 months.       Sherif Angel NP   Liver [place] of [address] [place] [address], [address] [facility], [address] [place] [address]   [phone]

## 2017-04-20 NOTE — TELEPHONE ENCOUNTER
Ms. Naty Patel would like for you to give her a call. She would like to speak with you about her drug test and lab work. She is very upset with Andrew at the time of her visit today.

## 2017-04-24 ENCOUNTER — TELEPHONE (OUTPATIENT)
Dept: HEMATOLOGY | Age: 62
End: 2017-04-24

## 2017-04-25 ENCOUNTER — HOSPITAL ENCOUNTER (OUTPATIENT)
Dept: LAB | Age: 62
Discharge: HOME | End: 2017-04-25

## 2017-04-25 PROCEDURE — 99001 SPECIMEN HANDLING PT-LAB: CPT | Performed by: NURSE PRACTITIONER

## 2017-05-04 LAB
AFP L3 MFR SERPL: 5.6 % (ref 0–9.9)
AFP SERPL-MCNC: 6 NG/ML (ref 0–8)
ALBUMIN SERPL-MCNC: 4.2 G/DL (ref 3.6–4.8)
ALP SERPL-CCNC: 78 IU/L (ref 39–117)
ALT SERPL-CCNC: 32 IU/L (ref 0–32)
AMPHETAMINES BLD QL SCN: NEGATIVE NG/ML
AST SERPL-CCNC: 29 IU/L (ref 0–40)
BARBITURATES SERPLBLD QL: NEGATIVE UG/ML
BASOPHILS # BLD AUTO: 0 X10E3/UL (ref 0–0.2)
BASOPHILS NFR BLD AUTO: 0 %
BILIRUB DIRECT SERPL-MCNC: 0.12 MG/DL (ref 0–0.4)
BILIRUB SERPL-MCNC: 0.4 MG/DL (ref 0–1.2)
BUN SERPL-MCNC: 12 MG/DL (ref 8–27)
BUN/CREAT SERPL: 12 (ref 12–28)
CALCIUM SERPL-MCNC: 9.6 MG/DL (ref 8.7–10.3)
CANCER AG19-9 SERPL-ACNC: 24 U/ML (ref 0–35)
CANNABINOIDS BLD QL SCN: NEGATIVE NG/ML
CHLORIDE SERPL-SCNC: 103 MMOL/L (ref 96–106)
CO2 SERPL-SCNC: 24 MMOL/L (ref 18–29)
COCAINE+BZE SERPLBLD QL SCN: NEGATIVE NG/ML
CREAT SERPL-MCNC: 1 MG/DL (ref 0.57–1)
EOSINOPHIL # BLD AUTO: 0.1 X10E3/UL (ref 0–0.4)
EOSINOPHIL NFR BLD AUTO: 2 %
ERYTHROCYTE [DISTWIDTH] IN BLOOD BY AUTOMATED COUNT: 13.6 % (ref 12.3–15.4)
ETHANOL BLD GC-MCNC: NEGATIVE %
GLUCOSE SERPL-MCNC: 85 MG/DL (ref 65–99)
HCT VFR BLD AUTO: 45.2 % (ref 34–46.6)
HCV NS5 MUT DET ISLT GENOTYP: NORMAL
HCV RESIS PANELL ISLT GENOTYP: NORMAL
HCV RNA SERPL NAA+PROBE-ACNC: NORMAL IU/ML
HCV RNA SERPL NAA+PROBE-ACNC: NORMAL IU/ML
HCV RNA SERPL NAA+PROBE-LOG IU: 7.04 LOG10 IU/ML
HGB BLD-MCNC: 14.8 G/DL (ref 11.1–15.9)
IMM GRANULOCYTES # BLD: 0 X10E3/UL (ref 0–0.1)
IMM GRANULOCYTES NFR BLD: 0 %
INR PPP: 1.1 (ref 0.8–1.2)
LYMPHOCYTES # BLD AUTO: 2.3 X10E3/UL (ref 0.7–3.1)
LYMPHOCYTES NFR BLD AUTO: 29 %
MCH RBC QN AUTO: 31 PG (ref 26.6–33)
MCHC RBC AUTO-ENTMCNC: 32.7 G/DL (ref 31.5–35.7)
MCV RBC AUTO: 95 FL (ref 79–97)
MONOCYTES # BLD AUTO: 0.5 X10E3/UL (ref 0.1–0.9)
MONOCYTES NFR BLD AUTO: 7 %
NEUTROPHILS # BLD AUTO: 4.8 X10E3/UL (ref 1.4–7)
NEUTROPHILS NFR BLD AUTO: 62 %
OPIATES BLD QL SCN: NEGATIVE NG/ML
OXYCODONES, 738315: NEGATIVE NG/ML
PCP BLD QL SCN: NEGATIVE NG/ML
PLATELET # BLD AUTO: 192 X10E3/UL (ref 150–379)
POTASSIUM SERPL-SCNC: 3.7 MMOL/L (ref 3.5–5.2)
PROT SERPL-MCNC: 8.2 G/DL (ref 6–8.5)
PROTHROMBIN TIME: 10.9 SEC (ref 9.1–12)
RBC # BLD AUTO: 4.78 X10E6/UL (ref 3.77–5.28)
REF LAB TEST METHOD: NORMAL
SODIUM SERPL-SCNC: 143 MMOL/L (ref 134–144)
TEST INFORMATION: NORMAL
WBC # BLD AUTO: 7.8 X10E3/UL (ref 3.4–10.8)

## 2017-06-26 ENCOUNTER — TELEPHONE (OUTPATIENT)
Dept: HEMATOLOGY | Age: 62
End: 2017-06-26

## 2017-08-03 ENCOUNTER — OFFICE VISIT (OUTPATIENT)
Dept: HEMATOLOGY | Age: 62
End: 2017-08-03

## 2017-08-03 ENCOUNTER — HOSPITAL ENCOUNTER (OUTPATIENT)
Dept: MRI IMAGING | Age: 62
Discharge: HOME | End: 2017-08-03
Payer: MEDICAID

## 2017-08-03 VITALS
TEMPERATURE: 97.8 F | SYSTOLIC BLOOD PRESSURE: 144 MMHG | HEIGHT: 62 IN | DIASTOLIC BLOOD PRESSURE: 88 MMHG | RESPIRATION RATE: 16 BRPM | OXYGEN SATURATION: 99 % | WEIGHT: 153.8 LBS | BODY MASS INDEX: 28.3 KG/M2 | HEART RATE: 55 BPM

## 2017-08-03 DIAGNOSIS — B18.2 CHRONIC HEPATITIS C WITHOUT HEPATIC COMA (HCC): Primary | ICD-10-CM

## 2017-08-03 DIAGNOSIS — K74.60 CIRRHOSIS OF LIVER WITHOUT ASCITES, UNSPECIFIED HEPATIC CIRRHOSIS TYPE (HCC): ICD-10-CM

## 2017-08-03 LAB — CREAT UR-MCNC: 1.2 MG/DL (ref 0.6–1.3)

## 2017-08-03 PROCEDURE — 74183 MRI ABD W/O CNTR FLWD CNTR: CPT

## 2017-08-03 PROCEDURE — 82565 ASSAY OF CREATININE: CPT

## 2017-08-03 PROCEDURE — A9585 GADOBUTROL INJECTION: HCPCS | Performed by: NURSE PRACTITIONER

## 2017-08-03 PROCEDURE — 74011250636 HC RX REV CODE- 250/636: Performed by: NURSE PRACTITIONER

## 2017-08-03 RX ADMIN — GADOBUTROL 10 ML: 604.72 INJECTION INTRAVENOUS at 12:06

## 2017-08-03 NOTE — MR AVS SNAPSHOT
Visit Information Date & Time Provider Department Dept. Phone Encounter #  
 8/3/2017  2:30 PM Laymon Fothergill, NP [address] [id number] Follow-up Instructions Return in about 3 months (around 11/3/2017). Your Appointments 8/3/2017  2:30 PM  
Follow Up with Laymon Fothergill, NP [facility] ([address]) Appt Note: HCV  
 [address] [place] [address]  
  
   
 [address] [facility] Upcoming Health Maintenance Date Due Pneumococcal 19-64 Highest Risk (1 of 3 - PCV13) 5/18/1974 DTaP/Tdap/Td series (1 - Tdap) 5/18/1976 PAP AKA CERVICAL CYTOLOGY 5/18/1976 BREAST CANCER SCRN MAMMOGRAM 5/18/2005 FOBT Q 1 YEAR AGE 50-75 5/18/2005 ZOSTER VACCINE AGE 60> 3/18/2015 INFLUENZA AGE 9 TO ADULT 8/1/2017 Allergies as of 8/3/2017  Review Complete On: 8/3/2017 By: Arnaud Baeza Severity Noted Reaction Type Reactions Lipitor [Atorvastatin]  06/29/2016    Swelling Current Immunizations  Never Reviewed No immunizations on file. Not reviewed this visit Vitals BP Pulse Temp Resp Height(growth percentile) 144/88 (BP 1 Location: Left arm, BP Patient Position: Sitting) (!) 55 97.8 °F (36.6 °C) (Tympanic) 16 5' 2\" (1.575 m) Weight(growth percentile) SpO2 BMI OB Status Smoking Status 153 lb 12.8 oz (69.8 kg) 99% 28.13 kg/m2 Menopause Current Every Day Smoker Vitals History BMI and BSA Data Body Mass Index Body Surface Area  
 28.13 kg/m 2 1.75 m 2 Preferred Pharmacy Pharmacy Name Phone [facility] @ [address] [address] Ana Fleischer [phone] Your Updated Medication List  
  
   
This list is accurate as of: 8/3/17  1:54 PM.  Always use your most recent med list.  
  
  
  
  
 cyclobenzaprine 5 mg tablet Commonly known as:  FLEXERIL Take 5 mg by mouth.  
  
 elbasvir-grazoprevir  mg Tab Commonly known as:  Catrina Fu Take 1 Tab by mouth daily for 84 days. Indications: CHRONIC HEPATITIS C - GENOTYPE 1  
  
 hydroCHLOROthiazide 25 mg tablet Commonly known as:  HYDRODIURIL Take 25 mg by mouth daily. PAXIL 10 mg tablet Generic drug:  PARoxetine Take  by mouth daily. Prescriptions Sent to Pharmacy Refills  
 elbasvir-grazoprevir (ZEPATIER)  mg tab 2 Sig: Take 1 Tab by mouth daily for 84 days. Indications: CHRONIC HEPATITIS C - GENOTYPE 1 Class: Normal  
 Pharmacy: Al[facility] 1233 @ [address] [facility] #: [phone] Route: Oral  
  
Follow-up Instructions Return in about 3 months (around 11/3/2017). Introducing [facility] & [facility]! [facility] introduces [employer] patient portal. Now you can access parts of your medical record, email your doctor's office, and request medication refills online. 1. In your internet browser, go to [email or web address] 2. Click on the First Time User? Click Here link in the Sign In box. You will see the New Member Sign Up page. 3. Enter your [employer] Access Code exactly as it appears below. You will not need to use this code after youve completed the sign-up process. If you do not sign up before the expiration date, you must request a new code. · [employer] Access Code: [email or web address] Expires: 9/24/2017  1:11 PM 
 
4. Enter the last four digits of your Social Security Number (xxxx) and Date of Birth (mm/dd/yyyy) as indicated and click Submit. You will be taken to the next sign-up page. 5. Create a Overture Technologiest ID. This will be your [employer] login ID and cannot be changed, so think of one that is secure and easy to remember. 6. Create a [employer] password. You can change your password at any time. 7. Enter your Password Reset Question and Answer.  This can be used at a later time if you forget your password. 8. Enter your e-mail address. You will receive e-mail notification when new information is available in [address] 9. Click Sign Up. You can now view and download portions of your medical record. 10. Click the Download Summary menu link to download a portable copy of your medical information. If you have questions, please visit the Frequently Asked Questions section of the Active[employer] website. Remember, [employer] is NOT to be used for urgent needs. For medical emergencies, dial 911. Now available from your iPhone and Android! Please provide this summary of care documentation to your next provider. Your primary care clinician is listed as [employer]. If you have any questions after today's visit, please call [phone].

## 2017-08-03 NOTE — PROGRESS NOTES
[address] Karlo Bender MD, [address], [facility], [place]       SAGE Elam Cera, PA-C Dolph Richard, NP Teddie Paling, NP        at [address], [address] Madi [address]  22.     [phone]     FAX: [phone]    at [address], [address] [facility], [address]     [phone]     FAX: [phone]         PCP: Patient Care Team:  Brittany Bradley DO as PCP - General (Family Practice)  Carlene Gandara MD (Internal Medicine)    Problem List  Date Reviewed: 8/3/2017          Codes Class Noted    Cholangiocarcinoma [facility]) (Chronic) ICD-10-CM: C22.1  ICD-9-CM: 155.1  6/29/2016        HTN (hypertension) ICD-10-CM: I10  ICD-9-CM: 401.9  6/15/2016        Depression ICD-10-CM: F32.9  ICD-9-CM: 848  6/15/2016        HCV (hepatitis C virus) (Chronic) ICD-10-CM: B19.20  ICD-9-CM: 070.70  6/15/2016        Primary malignant neoplasm of liver ([facility] [address] ICD-10-CM: C22.8  ICD-9-CM: 155.0  6/15/2016              Chris Marin returns regarding cholangiocarcinoma vs mixed unspecified type liver cancer and its management. She also has HCV. The patient is a 58y.o. year old Black female with history of hepatitis C with evidence of cirrhosis on biopsy 2/2016. This was biopsied and determined to be a poorly differentiated cholangiocarcinoma vs. Hep Par 1 negative HCC. There was also cirrhosis noted on this biopsy. She was briefly treated with interferon over 15 years ago but did not tolerate it. Imaging via MRI  Feb 2016 demonstrated 5.2 x 2.8 cm cm right hepatic mass. The lesion was not classic for [address] In August of 2016 she had the tumor removed. Surgical margins were clear. Gallbladder was also removed. She is doing well and is ready to treat HCV. She was lost to follow up post operatively.  She then went to see a GI closer to home to be treated for HCV and apparently was denied for uncertain reason. The patient has not developed edema. The patient has not developed hepatic encephalopathy. The patient has not had been evaluated for varices. The most recent laboratory studies indicate that the liver transaminases are normal, alkaline phosphatase is normal, tests of hepatic synthetic and metabolic function are normal, and the platelet count is normal.      The patient has no symptoms which could be attributed to the liver disorder. The patient completes all daily activities without any functional limitations. The patient has not experienced fatigvers, chills, shortness of breath, chest pain, pain in the right side over the liver, diffuse abdominal pain, nausea, vomiting, constipation, diarrhea, dry eyes, dry mouth, arthralgias, myalgias, yellowing of the eyes or skin, itching, dark urine, problems concentrating, swelling of the abdomen, swelling of the lower extremities, hematemesis, hematochezia. ALLERGIES  Allergies   Allergen Reactions    Lipitor [Atorvastatin] Swelling       MEDICATIONS  Current Outpatient Prescriptions   Medication Sig    cyclobenzaprine (FLEXERIL) 5 mg tablet Take 5 mg by mouth.  PARoxetine (PAXIL) 10 mg tablet Take  by mouth daily.  hydrochlorothiazide (HYDRODIURIL) 25 mg tablet Take 25 mg by mouth daily. No current facility-administered medications for this visit. SYSTEM REVIEW NOT RELATED TO LIVER DISEASE OR REVIEWED ABOVE:  Constitution systems: Negative for fever, chills, weight gain, weight loss. Eyes: Negative for visual changes. ENT: Negative for sore throat, painful swallowing. Respiratory: Negative for cough, hemoptysis, SOB. Cardiology: Negative for chest pain, palpitations. GI:  Negative for constipation or diarrhea. : Negative for urinary frequency, dysuria, hematuria, nocturia. Skin: Negative for rash. Hematology: Negative for easy bruising, blood clots.     Musculo-skelatal: Negative for back pain, muscle pain, weakness. Neurologic: Negative for headaches, dizziness, vertigo, memory problems not related to HE. Psychology: Negative for anxiety, depression. FAMILY HISTORY:  The father Passed away. No knowledge of medical histry  The mother passed from kidney disease. There is no family history of liver disease. SOCIAL HISTORY:  The patient is . The patient has 5 children. The patient smokes 3-4 cigarettes/day. The patient is retired. She does get social security. PHYSICAL EXAMINATION:  Visit Vitals    /88 (BP 1 Location: Left arm, BP Patient Position: Sitting)    Pulse (!) 55    Temp 97.8 °F (36.6 °C) (Tympanic)    Resp 16    Ht 5' 2\" (1.575 m)    Wt 153 lb 12.8 oz (69.8 kg)    SpO2 99%    BMI 28.13 kg/m2     General: No acute distress. Eyes: Sclera anicteric. ENT: No oral lesions. Thyroid normal.  Nodes: No adenopathy. Skin: No spider angiomata. No jaundice. No palmar erythema. Respiratory: Lungs clear to auscultation. Cardiovascular: Regular heart rate. No murmurs. No JVD. Abdomen: Soft non-tender. Liver size normal to percussion/palpation. Spleen not palpable. No obvious ascites. Extremities: No edema. No muscle wasting. No gross arthritic changes. Neurologic: Alert and oriented. Cranial nerves grossly intact. No asterixis.     LABORATORY STUDIES:  [address] & Units 4/25/2017 2/17/2017   WBC 3.4 - 10.8 x10E3/uL 7.8 7.6   ANC 1.4 - 7.0 x10E3/uL 4.8 4.4   HGB 11.1 - 15.9 g/dL 14.8 13.6   HGB 12 - 16 G/DL     HGB (iSTAT) 12 - 16 G/DL      - 379 x10E3/uL 192 200   INR 0.8 - 1.2 1.1 1.0   AST 0 - 40 IU/L 29 32   ALT 0 - 32 IU/L 32 32   Alk Phos 39 - 117 IU/L 78 88   Bili, Total 0.0 - 1.2 mg/dL 0.4 0.3   Bili, Direct 0.00 - 0.40 mg/dL 0.12 0.11   Albumin 3.6 - 4.8 g/dL 4.2 3.8   BUN 8 - 27 mg/dL 12 14   BUN (iSTAT) 7 - 18 MG/DL     Creat 0.57 - 1.00 mg/dL 1.00 1.01 (H)   Creat (iSTAT) 0.6 - 1.3 MG/DL     Na 134 - 144 mmol/L 143 140   K 3.5 - 5.2 mmol/L 3.7 3.7   Cl 96 - 106 mmol/L 103 101   CO2 18 - 29 mmol/L 24 24   Glucose 65 - 99 mg/dL 85 74   Ammonia 11 - 32 UMOL/L       Cancer Screening Latest Ref Rng & Units 4/25/2017 2/17/2017  6/15/2016   AFP, Serum 0.0 - 8.0 ng/mL 6.0 5.2  571.5 (H)   AFP-L3% 0.0 - 9.9 % 5.6 6.1  86.4 (H)   CA 19-9 0 - 35 U/mL 24 22  24   CEA 0.0 - 4.7 ng/mL    2.9     SEROLOGIES:  Serologies Latest Ref Rng & Units 2/17/2017   Hep A Ab, Total Negative Negative   Hep B Surface Ag Negative Negative   Hep B Core Ab, Total Negative Positive (A)   Hep B Surface AB QL  Reactive   Hep C Genotype  1a   HCV RT-PCR, Quant IU/mL [id number]     LIVER HISTOLOGY:  02/2016 Liver biopsy POORLY DIFFERENTIATED CHOLANGIOCARCINOMA. Positive for cirrhosis. Slides not reviewed by Dr. Nancy Bentley. ENDOSCOPIC PROCEDURES:  03/2017. EGD by Dr. Raúl Reyes. Esophagus is normal.  pathology is negative. Repeat in 03/2019. RADIOLOGY:  02/2016 MRI. Inferior right hepatic lobe mass with imaging features that are not entirely specific. This is a hypovascular lesion with intrahepatic cholangiocarcinoma and hypovascular metastasis as primary differential possibilities. This lesion was biopsied previously on 1/28/2016.   06/2016. MRI w/ MRCP w/wo contrast.  Stable mass in the inferior right hepatic lobe. Nataliia hepatis and right cardiophrenic lymph nodes are also unchanged. No new findings to suggest progressive/metastatic disease. 02/2017. Abdominal MRI w/wo contrast.  Surgically truncated inferior margin of the right liver lobe which was the site of the tumor. No residual or new enhancing mass lesion. 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  The most recent laboratory studies indicate that the liver transaminases are normal, alkaline phosphatase is normal, tests of hepatic synthetic and metabolic function are   normal, and the platelet count is normal.  The patient refuses to have lab work drawn today. Complications of cirrhosis were discussed in detail. We discussed thrombocytopenia, portal hypertension, varices, GI bleeding, peripheral edema, ascites, hepatic encephalopathy, and hepatocellular carcinoma. We discussed the need for follow ups on a regular basis, at 3 month intervals to monitor for complications. We discussed the need for every 6 month liver imaging studies. She is s/p hepatic wedge resection of the tumor in 8/2016. Follow up imaging mehta recently been performed and no residual or new enhancing mass lesion was found. Repeat MRI Q6 months. Patient does have cirrhosis mentioned on biopsy though her liver function appears well preserved. HCV treatment should be pursued now given her excellent surgical outcome. I am not clear why she was denied before. We discussed treatment options in detail. One treatment option is Zepatier, a combination medication utilizing elbasvir (an NS5A inhibitor) and grazoprevir (an NS3/4A protease inhibitor), for either 12 weeks. She does not have NS5A resistance. Corean [place] was ordered today through [facility]. I have explained to her that I have ordered the Zepatier through our specialty pharmacy and if she is approved for treatment by her insurance carrier, the Corean Shweta will be delivered to her home. She may begin taking the Zepatier as soon it they arrives. She is to call and make an appointment for treatment week #4 once she begins therapy. She voiced understanding of this plan. These instructions were also given to her in print form along with Zepatier patient education packet. EGD was recently performed and her she has a normal esophagus. This needs to be repeated in two years. All of the above issues were discussed with the patient. All questions were answered.   The patient expressed a clear understanding of the above. 25 minutes total time spent with this patient with more than 50% of this time spent counseling and coordinating care as described above. [facility] 87 in 3 months.       Lino Garcia NP   [facility] [address] [place] [address], [address] [place] [address], [address] [place] [address]   [phone]

## 2017-08-30 ENCOUNTER — TELEPHONE (OUTPATIENT)
Dept: HEMATOLOGY | Age: 62
End: 2017-08-30

## 2017-08-30 NOTE — TELEPHONE ENCOUNTER
Pt. States on her last visit with Panda Hatch, NP he told her she would need labs drawn before her f/up . Chago Greer Please order !

## 2017-09-05 ENCOUNTER — TELEPHONE (OUTPATIENT)
Dept: HEMATOLOGY | Age: 62
End: 2017-09-05

## 2017-09-05 DIAGNOSIS — B18.2 HEP C W/O COMA, CHRONIC (HCC): Primary | ICD-10-CM

## 2017-09-20 ENCOUNTER — TELEPHONE (OUTPATIENT)
Dept: HEMATOLOGY | Age: 62
End: 2017-09-20

## 2017-09-20 NOTE — TELEPHONE ENCOUNTER
Patient would like for you to put in a lab order for her 4 week treatment process. She would like to her her labs done at [facility].

## 2017-09-21 ENCOUNTER — TELEPHONE (OUTPATIENT)
Dept: HEMATOLOGY | Age: 62
End: 2017-09-21

## 2017-09-21 ENCOUNTER — HOSPITAL ENCOUNTER (OUTPATIENT)
Dept: LAB | Age: 62
Discharge: HOME | End: 2017-09-21

## 2017-09-21 DIAGNOSIS — B18.2 CHRONIC HEPATITIS C WITHOUT HEPATIC COMA (HCC): Primary | ICD-10-CM

## 2017-09-21 PROCEDURE — 99001 SPECIMEN HANDLING PT-LAB: CPT | Performed by: NURSE PRACTITIONER

## 2017-09-21 NOTE — TELEPHONE ENCOUNTER
Patient states that she hasn't taken Zepatier for 2 days (including today) and is having issues with [facility] filling and sending her medication in. Called [facility]. They informed me that they require a prior authorization before filling the medication. Please call patient at earliest convenience.

## 2017-09-21 NOTE — TELEPHONE ENCOUNTER
Patient states that she hasn't taken Zepatier for 2 days (including today) and is having issues with the pharmacy filling her medication. Pt states that the  Pharmacy informed her that Jalil Valentino NP needs to signed off on the medication before the can move further . ..     Please Advise

## 2017-09-21 NOTE — TELEPHONE ENCOUNTER
Returned pts call re PA for medication, informed pt that a PA was initiated and will find out today around 3pm if approved. On 9/25/17 PA was approved for medication, called pt to inform her and gave pt the number to Xavier At [address] [place] [address] to set up shippment time.

## 2017-09-23 LAB
ALBUMIN SERPL-MCNC: 4 G/DL (ref 3.6–4.8)
ALP SERPL-CCNC: 78 IU/L (ref 39–117)
ALT SERPL-CCNC: 13 IU/L (ref 0–32)
AST SERPL-CCNC: 21 IU/L (ref 0–40)
BASOPHILS # BLD AUTO: 0 X10E3/UL (ref 0–0.2)
BASOPHILS NFR BLD AUTO: 0 %
BILIRUB DIRECT SERPL-MCNC: 0.1 MG/DL (ref 0–0.4)
BILIRUB SERPL-MCNC: 0.3 MG/DL (ref 0–1.2)
BUN SERPL-MCNC: 15 MG/DL (ref 8–27)
BUN/CREAT SERPL: 14 (ref 12–28)
CALCIUM SERPL-MCNC: 9.1 MG/DL (ref 8.7–10.3)
CHLORIDE SERPL-SCNC: 101 MMOL/L (ref 96–106)
CO2 SERPL-SCNC: 23 MMOL/L (ref 18–29)
CREAT SERPL-MCNC: 1.05 MG/DL (ref 0.57–1)
EOSINOPHIL # BLD AUTO: 0.1 X10E3/UL (ref 0–0.4)
EOSINOPHIL NFR BLD AUTO: 2 %
ERYTHROCYTE [DISTWIDTH] IN BLOOD BY AUTOMATED COUNT: 14.4 % (ref 12.3–15.4)
GLUCOSE SERPL-MCNC: 95 MG/DL (ref 65–99)
HCT VFR BLD AUTO: 40.1 % (ref 34–46.6)
HCV RNA SERPL QL NAA+PROBE: NEGATIVE
HGB BLD-MCNC: 13.4 G/DL (ref 11.1–15.9)
IMM GRANULOCYTES # BLD: 0 X10E3/UL (ref 0–0.1)
IMM GRANULOCYTES NFR BLD: 0 %
LYMPHOCYTES # BLD AUTO: 2 X10E3/UL (ref 0.7–3.1)
LYMPHOCYTES NFR BLD AUTO: 28 %
MCH RBC QN AUTO: 30.4 PG (ref 26.6–33)
MCHC RBC AUTO-ENTMCNC: 33.4 G/DL (ref 31.5–35.7)
MCV RBC AUTO: 91 FL (ref 79–97)
MONOCYTES # BLD AUTO: 0.5 X10E3/UL (ref 0.1–0.9)
MONOCYTES NFR BLD AUTO: 7 %
NEUTROPHILS # BLD AUTO: 4.6 X10E3/UL (ref 1.4–7)
NEUTROPHILS NFR BLD AUTO: 63 %
PLATELET # BLD AUTO: 222 X10E3/UL (ref 150–379)
POTASSIUM SERPL-SCNC: 3.8 MMOL/L (ref 3.5–5.2)
PROT SERPL-MCNC: 7.3 G/DL (ref 6–8.5)
RBC # BLD AUTO: 4.41 X10E6/UL (ref 3.77–5.28)
SODIUM SERPL-SCNC: 141 MMOL/L (ref 134–144)
WBC # BLD AUTO: 7.3 X10E3/UL (ref 3.4–10.8)

## 2017-11-06 ENCOUNTER — OFFICE VISIT (OUTPATIENT)
Dept: HEMATOLOGY | Age: 62
End: 2017-11-06

## 2017-11-06 VITALS
HEART RATE: 43 BPM | WEIGHT: 147 LBS | DIASTOLIC BLOOD PRESSURE: 80 MMHG | TEMPERATURE: 98 F | HEIGHT: 62 IN | SYSTOLIC BLOOD PRESSURE: 123 MMHG | BODY MASS INDEX: 27.05 KG/M2 | OXYGEN SATURATION: 99 %

## 2017-11-06 DIAGNOSIS — B18.2 CHRONIC HEPATITIS C WITHOUT HEPATIC COMA (HCC): Primary | ICD-10-CM

## 2017-11-06 DIAGNOSIS — B18.2 CHRONIC HEPATITIS C WITHOUT HEPATIC COMA (HCC): ICD-10-CM

## 2017-11-06 RX ORDER — TRAZODONE HYDROCHLORIDE 50 MG/1
50 TABLET ORAL DAILY
COMMUNITY
Start: 2017-09-20 | End: 2019-03-25

## 2017-11-06 NOTE — MR AVS SNAPSHOT
Visit Information Date & Time Provider Department Dept. Phone Encounter #  
 11/6/2017  1:30 PM Marcel Syed NP [address] [phone] Follow-up Instructions Return in about 3 months (around 2/6/2018). Your Appointments 3/7/2018 11:30 AM  
Follow Up with [place] [address] [facility] Appt Note: 6mnth f/up per [place], [address] [address] Upcoming Health Maintenance Date Due Pneumococcal 19-64 Highest Risk (1 of 3 - PCV13) 5/18/1974 DTaP/Tdap/Td series (1 - Tdap) 5/18/1976 PAP AKA CERVICAL CYTOLOGY 5/18/1976 BREAST CANCER SCRN MAMMOGRAM 5/18/2005 FOBT Q 1 YEAR AGE 50-75 5/18/2005 ZOSTER VACCINE AGE 60> 3/18/2015 INFLUENZA AGE 9 TO ADULT 8/1/2017 Allergies as of 11/6/2017  Review Complete On: 11/6/2017 By: Gaurav Bejarano Severity Noted Reaction Type Reactions Lipitor [Atorvastatin]  06/29/2016    Swelling Current Immunizations  Never Reviewed No immunizations on file. Not reviewed this visit You Were Diagnosed With   
  
 Codes Comments Chronic hepatitis C without hepatic coma (HCC)    -  Primary ICD-10-CM: B18.2 ICD-9-CM: 070.54 Vitals BP Pulse Temp Height(growth percentile) Weight(growth percentile) SpO2  
 123/80 (!) 43 98 °F (36.7 °C) (Tympanic) 5' 2\" (1.575 m) 147 lb (66.7 kg) 99% BMI OB Status Smoking Status 26.89 kg/m2 Menopause Current Some Day Smoker Vitals History BMI and BSA Data Body Mass Index Body Surface Area  
 26.89 kg/m 2 1.71 m 2 Preferred Pharmacy Pharmacy Name Phone [facility] @ [address] [facility], [address] [address] Sherly Edgar [phone] Your Updated Medication List  
  
   
 This list is accurate as of: 11/6/17  3:21 PM.  Always use your most recent med list.  
  
  
  
  
 cyclobenzaprine 5 mg tablet Commonly known as:  FLEXERIL Take 5 mg by mouth. hydroCHLOROthiazide 25 mg tablet Commonly known as:  HYDRODIURIL Take 25 mg by mouth daily. PAXIL 10 mg tablet Generic drug:  PARoxetine Take  by mouth daily. traZODone 50 mg tablet Commonly known as:  Lansford Askew Take 50 mg by mouth daily. Follow-up Instructions Return in about 3 months (around 2/6/2018). To-Do List   
 11/06/2017 Lab:  AFP WITH AFP-L3% 11/06/2017 Lab:  AMMONIA   
  
 11/06/2017 Lab:  CBC WITH AUTOMATED DIFF   
  
 11/06/2017 Lab:  HCV, QT WITH GRAPH   
  
 11/06/2017 Lab:  HEPATIC FUNCTION PANEL   
  
 11/06/2017 Lab:  METABOLIC PANEL, BASIC   
  
 11/06/2017 Lab:  PROTHROMBIN TIME + INR   
  
 12/06/2017 Imaging:  MRI ABD W WO CONT Referral Information Referral ID Referred By Referred To  
  
 [id number] Luda [facility] Not Available Visits Status Start Date End Date 1 New Request 11/6/17 11/6/18 If your referral has a status of pending review or denied, additional information will be sent to support the outcome of this decision. Introducing [facility] & HEALTH SERVICES! Imelda Jacobo introduces [employer] patient portal. Now you can access parts of your medical record, email your doctor's office, and request medication refills online. 1. In your internet browser, go to [email or web address] 2. Click on the First Time User? Click Here link in the Sign In box. You will see the New Member Sign Up page. 3. Enter your [employer] Access Code exactly as it appears below. You will not need to use this code after youve completed the sign-up process. If you do not sign up before the expiration date, you must request a new code. · [employer] Access Code: [email or web address] Expires: 1/29/2018  2:03 PM 
 
 4. Enter the last four digits of your Social Security Number (xxxx) and Date of Birth (mm/dd/yyyy) as indicated and click Submit. You will be taken to the next sign-up page. 5. Create a Eyeonix ID. This will be your [employer] login ID and cannot be changed, so think of one that is secure and easy to remember. 6. Create a [employer] password. You can change your password at any time. 7. Enter your Password Reset Question and Answer. This can be used at a later time if you forget your password. 8. Enter your e-mail address. You will receive e-mail notification when new information is available in [address] 9. Click Sign Up. You can now view and download portions of your medical record. 10. Click the Download Summary menu link to download a portable copy of your medical information. If you have questions, please visit the Frequently Asked Questions section of the [employer] website. Remember, [employer] is NOT to be used for urgent needs. For medical emergencies, dial 911. Now available from your iPhone and Android! Please provide this summary of care documentation to your next provider. Your primary care clinician is listed as Adele Davenport. If you have any questions after today's visit, please call [phone].

## 2017-11-06 NOTE — PROGRESS NOTES
[address] Bender MD, Chung Romero, Chemo Vicente, [place]       Luna Villegas, SAGE Torres, FANNY Jimenes, ZARI-BC   SAGE Orourke NP        at [address], [address] [facility], [address]     [phone]     FAX: [phone]    at [address], [id number] [place] [address], [address]     [phone]     FAX: [phone]         PCP: Patient Care Team:  Sosa Armstrong DO as PCP - General (Family Practice)  Darcy Dill MD (Internal Medicine)    Problem List  Date Reviewed: 11/6/2017          Codes Class Noted    Cholangiocarcinoma ([facility] [address] (Chronic) ICD-10-CM: C22.1  ICD-9-CM: 155.1  6/29/2016        HTN (hypertension) ICD-10-CM: I10  ICD-9-CM: 401.9  6/15/2016        Depression ICD-10-CM: F32.9  ICD-9-CM: 946  6/15/2016        HCV (hepatitis C virus) (Chronic) ICD-10-CM: B19.20  ICD-9-CM: 070.70  6/15/2016        Primary malignant neoplasm of liver ([facility] [address] ICD-10-CM: C22.8  ICD-9-CM: 155.0  6/15/2016              Erasmo Javier returns regarding cholangiocarcinoma vs mixed unspecified type liver cancer and its management. She also has HCV. The patient began HCV with once daily Zepatier. She is to be treated for 12 weeks total.  There seems to have been an interruption in her therapy and she may have missed as much as 7 days of treatment. She began her 3 packet of 14 tablets on 11/05/2017. The patient is a 58y.o. year old Black female with history of hepatitis C with evidence of cirrhosis on biopsy 2/2016. This was biopsied and determined to be a poorly differentiated cholangiocarcinoma vs. Hep Par 1 negative HCC. There was also cirrhosis noted on this biopsy. She was briefly treated with interferon over 15 years ago but did not tolerate it. Imaging via MRI  Feb 2016 demonstrated 5.2 x 2.8 cm cm right hepatic mass.  The lesion was not classic for [facility].. In August of 2016 she had the tumor removed. Surgical margins were clear. Gallbladder was also removed. She was lost to follow up post operatively. She then went to see a GI closer to home to be treated for HCV and apparently was denied for uncertain reason. The patient has not developed edema. The patient has not developed hepatic encephalopathy. The patient has not had been evaluated for varices. The most recent laboratory studies indicate that the liver transaminases are normal, alkaline phosphatase is normal, tests of hepatic synthetic and metabolic function are normal, and the platelet count is normal.      The patient has no symptoms which could be attributed to the liver disorder. The patient completes all daily activities without any functional limitations. The patient has not experienced fatigvers, chills, shortness of breath, chest pain, pain in the right side over the liver, diffuse abdominal pain, nausea, vomiting, constipation, diarrhea, dry eyes, dry mouth, arthralgias, myalgias, yellowing of the eyes or skin, itching, dark urine, problems concentrating, swelling of the abdomen, swelling of the lower extremities, hematemesis, hematochezia. ALLERGIES  Allergies   Allergen Reactions    Lipitor [Atorvastatin] Swelling       MEDICATIONS  Current Outpatient Prescriptions   Medication Sig    traZODone (DESYREL) 50 mg tablet Take 50 mg by mouth daily.  cyclobenzaprine (FLEXERIL) 5 mg tablet Take 5 mg by mouth.  PARoxetine (PAXIL) 10 mg tablet Take  by mouth daily.  hydrochlorothiazide (HYDRODIURIL) 25 mg tablet Take 25 mg by mouth daily. No current facility-administered medications for this visit. SYSTEM REVIEW NOT RELATED TO LIVER DISEASE OR REVIEWED ABOVE:  Constitution systems: Negative for fever, chills, weight gain, weight loss. Eyes: Negative for visual changes. ENT: Negative for sore throat, painful swallowing.    Respiratory: Negative for cough, hemoptysis, SOB. Cardiology: Negative for chest pain, palpitations. GI:  Negative for constipation or diarrhea. : Negative for urinary frequency, dysuria, hematuria, nocturia. Skin: Negative for rash. Hematology: Negative for easy bruising, blood clots. Musculo-skelatal: Negative for back pain, muscle pain, weakness. Neurologic: Negative for headaches, dizziness, vertigo, memory problems not related to HE. Psychology: Negative for anxiety, depression. FAMILY HISTORY:  The father Passed away. No knowledge of medical histry  The mother passed from kidney disease. There is no family history of liver disease. SOCIAL HISTORY:  The patient is . The patient has 5 children. The patient smokes 3-4 cigarettes/day. The patient is retired. She does get social security. PHYSICAL EXAMINATION:  Visit Vitals    /80    Pulse (!) 43    Temp 98 °F (36.7 °C) (Tympanic)    Ht 5' 2\" (1.575 m)    Wt 147 lb (66.7 kg)    SpO2 99%    BMI 26.89 kg/m2     General: No acute distress. Eyes: Sclera anicteric. ENT: No oral lesions. Thyroid normal.  Nodes: No adenopathy. Skin: No spider angiomata. No jaundice. No palmar erythema. Respiratory: Lungs clear to auscultation. Cardiovascular: Regular heart rate. No murmurs. No JVD. Abdomen: Soft non-tender. Liver size normal to percussion/palpation. Spleen not palpable. No obvious ascites. Extremities: No edema. No muscle wasting. No gross arthritic changes. Neurologic: Alert and oriented. Cranial nerves grossly intact. No asterixis.     LABORATORY STUDIES:  [facility] [place] of [address] [place] [address] 9/21/2017   WBC 3.4 - 10.8 x10E3/uL 7.3   ANC 1.4 - 7.0 x10E3/uL 4.6   HGB 11.1 - 15.9 g/dL 13.4   HGB 12 - 16 G/DL    HGB (iSTAT) 12 - 16 G/DL     - 379 x10E3/uL 222   INR 0.8 - 1.2    AST 0 - 40 IU/L 21   ALT 0 - 32 IU/L 13   Alk Phos 39 - 117 IU/L 78   Bili, Total 0.0 - 1.2 mg/dL 0.3   Bili, Direct 0.00 - 0.40 mg/dL 0.10   Albumin 3.6 - 4.8 g/dL 4.0   BUN 8 - 27 mg/dL 15   BUN (iSTAT) 7 - 18 MG/DL    Creat 0.57 - 1.00 mg/dL 1.05 (H)   Creat (iSTAT) 0.6 - 1.3 MG/DL    Na 134 - 144 mmol/L 141   K 3.5 - 5.2 mmol/L 3.8   Cl 96 - 106 mmol/L 101   CO2 18 - 29 mmol/L 23   Glucose 65 - 99 mg/dL 95   Ammonia 11 - 32 UMOL/L      Liver [facility] Latest Ref Rng & Units 4/25/2017 2/17/2017   WBC 3.4 - 10.8 x10E3/uL 7.8 7.6   ANC 1.4 - 7.0 x10E3/uL 4.8 4.4   HGB 11.1 - 15.9 g/dL 14.8 13.6   HGB 12 - 16 G/DL     HGB (iSTAT) 12 - 16 G/DL      - 379 x10E3/uL 192 200   INR 0.8 - 1.2 1.1 1.0   AST 0 - 40 IU/L 29 32   ALT 0 - 32 IU/L 32 32   Alk Phos 39 - 117 IU/L 78 88   Bili, Total 0.0 - 1.2 mg/dL 0.4 0.3   Bili, Direct 0.00 - 0.40 mg/dL 0.12 0.11   Albumin 3.6 - 4.8 g/dL 4.2 3.8   BUN 8 - 27 mg/dL 12 14   BUN (iSTAT) 7 - 18 MG/DL     Creat 0.57 - 1.00 mg/dL 1.00 1.01 (H)   Creat (iSTAT) 0.6 - 1.3 MG/DL     Na 134 - 144 mmol/L 143 140   K 3.5 - 5.2 mmol/L 3.7 3.7   Cl 96 - 106 mmol/L 103 101   CO2 18 - 29 mmol/L 24 24   Glucose 65 - 99 mg/dL 85 74   Ammonia 11 - 32 UMOL/L       Cancer Screening Latest Ref Rng & Units 4/25/2017 2/17/2017  6/15/2016   AFP, Serum 0.0 - 8.0 ng/mL 6.0 5.2  571.5 (H)   AFP-L3% 0.0 - 9.9 % 5.6 6.1  86.4 (H)   CA 19-9 0 - 35 U/mL 24 22  24   CEA 0.0 - 4.7 ng/mL    2.9     SEROLOGIES:  Serologies Latest Ref Rng & Units 2/17/2017   Hep A Ab, Total Negative Negative   Hep B Surface Ag Negative Negative   Hep B Core Ab, Total Negative Positive (A)   Hep B Surface AB QL  Reactive   Hep C Genotype  1a   HCV RT-PCR, Quant IU/mL [id number]     LIVER HISTOLOGY:  02/2016 Liver biopsy POORLY DIFFERENTIATED CHOLANGIOCARCINOMA. Positive for cirrhosis. Slides not reviewed by Dr. Emeterio Marcano. ENDOSCOPIC PROCEDURES:  03/2017. EGD by Dr. More Hand. Esophagus is normal.  pathology is negative. Repeat in 03/2019. RADIOLOGY:  02/2016 MRI.  Inferior right hepatic lobe mass with imaging features that are not entirely specific. This is a hypovascular lesion with intrahepatic cholangiocarcinoma and hypovascular metastasis as primary differential possibilities. This lesion was biopsied previously on 1/28/2016.   06/2016. MRI w/ MRCP w/wo contrast.  Stable mass in the inferior right hepatic lobe. Nataliia hepatis and right cardiophrenic lymph nodes are also unchanged. No new findings to suggest progressive/metastatic disease. 02/2017. Abdominal MRI w/wo contrast.  Surgically truncated inferior margin of the right liver lobe which was the site of the tumor. No residual or new enhancing mass lesion. 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 The most recent laboratory studies indicate that the liver transaminases are normal, alkaline phosphatase is normal, tests of hepatic synthetic and metabolic function are   normal, and the platelet count is normal.  Will perform laboratory testing to monitor liver function and degree of liver injury. This will include hepatic panel, a CBC w/ diff, a BMP, a PT/INR, an AFP-L3%, and an HCV RNA. Complications of cirrhosis were discussed in detail. We discussed thrombocytopenia, portal hypertension, varices, GI bleeding, peripheral edema, ascites, hepatic encephalopathy, and hepatocellular carcinoma. We discussed the need for follow ups on a regular basis, at 3 month intervals to monitor for complications. We discussed the need for every 6 month liver imaging studies. She is s/p hepatic wedge resection of the tumor in 8/2016. Follow up imaging has been performed and no residual or new enhancing mass lesion was found. An ultrasound with shear wave elastography was ordered at her last office visit, but she did not have this completed. Repeat MRI Q6 months to screen for HCC/cholanigiocarcinomas.       Patient does have cirrhosis mentioned on biopsy though her liver function appears well preserved. The patient has genotype 1A and has previously failed interferon based therapy over 15 years ago. The patient began HCV with once daily Zepatier. She is to be treated for 12 weeks total.  There seems to have been an interruption in her therapy and she may have missed as much as 7 days of treatment. She began her 3 packet of 14 tablets on 11/05/2017. She was virus undetectable in 09/2017. She is a poor historian, so I am not sure where she is in treatment or how adherent to the treatment regime she has been. EGD was recently performed and her she has a normal esophagus. This needs to be repeated in two years. All of the above issues were discussed with the patient. All questions were answered. The patient expressed a clear understanding of the above. [facility] 87 in 3 months.       Flavia Ye NP   L[facility] [place] of [address] [place] [address], [address] [place] [address]   [phone]

## 2017-12-06 ENCOUNTER — TELEPHONE (OUTPATIENT)
Dept: HEMATOLOGY | Age: 62
End: 2017-12-06

## 2017-12-06 NOTE — TELEPHONE ENCOUNTER
Pt called in asking for refill of medication \"starting with an A\"  , I asked pt if she knew the name and dose of the medication and pt states she was unsure , I do not have any medications on my end in her chart that start with an \"A\"   I advised pt that she would have to know the name and dose before I sent over refill  Pt asks if nurse or provider could give her a call

## 2018-05-03 ENCOUNTER — DOCUMENTATION ONLY (OUTPATIENT)
Dept: HEMATOLOGY | Age: 63
End: 2018-05-03

## 2018-05-07 ENCOUNTER — OFFICE VISIT (OUTPATIENT)
Dept: HEMATOLOGY | Age: 63
End: 2018-05-07

## 2018-05-07 ENCOUNTER — HOSPITAL ENCOUNTER (OUTPATIENT)
Age: 63
Discharge: HOME | End: 2018-05-07
Payer: MEDICAID

## 2018-05-07 VITALS
SYSTOLIC BLOOD PRESSURE: 161 MMHG | OXYGEN SATURATION: 98 % | WEIGHT: 152 LBS | TEMPERATURE: 98.3 F | BODY MASS INDEX: 27.97 KG/M2 | HEART RATE: 53 BPM | RESPIRATION RATE: 18 BRPM | HEIGHT: 62 IN | DIASTOLIC BLOOD PRESSURE: 103 MMHG

## 2018-05-07 DIAGNOSIS — B18.2 CHRONIC HEPATITIS C WITHOUT HEPATIC COMA (HCC): ICD-10-CM

## 2018-05-07 DIAGNOSIS — K74.60 CIRRHOSIS OF LIVER WITHOUT ASCITES, UNSPECIFIED HEPATIC CIRRHOSIS TYPE (HCC): Primary | ICD-10-CM

## 2018-05-07 LAB — CREAT UR-MCNC: 1.1 MG/DL (ref 0.6–1.3)

## 2018-05-07 PROCEDURE — 74011250636 HC RX REV CODE- 250/636: Performed by: NURSE PRACTITIONER

## 2018-05-07 PROCEDURE — 82565 ASSAY OF CREATININE: CPT

## 2018-05-07 PROCEDURE — A9585 GADOBUTROL INJECTION: HCPCS | Performed by: NURSE PRACTITIONER

## 2018-05-07 PROCEDURE — 74183 MRI ABD W/O CNTR FLWD CNTR: CPT

## 2018-05-07 RX ORDER — OLANZAPINE 10 MG/1
10 TABLET ORAL
COMMUNITY
Start: 2017-04-20 | End: 2019-03-25

## 2018-05-07 RX ORDER — METOPROLOL TARTRATE 50 MG/1
50 TABLET ORAL
COMMUNITY
Start: 2017-04-20 | End: 2019-03-25 | Stop reason: SDUPTHER

## 2018-05-07 RX ADMIN — GADOBUTROL 10 ML: 604.72 INJECTION INTRAVENOUS at 11:00

## 2018-05-07 NOTE — MR AVS SNAPSHOT
[address] [address] 
[phone] Patient: Mary Ann Green MRN: [id number] Visit Information Date & Time Provider Department Dept. Phone Encounter #  
 5/7/2018  1:00 PM SAGE Pisano [address] of  [address] [id number] Follow-up Instructions Return in about 3 months (around 8/7/2018). Upcoming Health Maintenance Date Due Pneumococcal 19-64 Highest Risk (1 of 3 - PCV13) 5/18/1974 DTaP/Tdap/Td series (1 - Tdap) 5/18/1976 PAP AKA CERVICAL CYTOLOGY 5/18/1976 BREAST CANCER SCRN MAMMOGRAM 5/18/2005 FOBT Q 1 YEAR AGE 50-75 5/18/2005 ZOSTER VACCINE AGE 60> 3/18/2015 Influenza Age 5 to Adult 8/1/2018 Allergies as of 5/7/2018  Review Complete On: 5/7/2018 By: Jai Ramirez Severity Noted Reaction Type Reactions Lipitor [Atorvastatin]  06/29/2016    Swelling Current Immunizations  Never Reviewed No immunizations on file. Not reviewed this visit You Were Diagnosed With   
  
 Codes Comments Cirrhosis of liver without ascites, unspecified hepatic cirrhosis type ([facility]    -  Primary ICD-10-CM: K74.60 ICD-9-CM: 571.5 Vitals BP Pulse Temp Resp Height(growth percentile) (!) 161/103 (BP 1 Location: Right arm, BP Patient Position: Sitting) (!) 53 98.3 °F (36.8 °C) (Tympanic) 18 5' 2\" (1.575 m) Weight(growth percentile) SpO2 BMI OB Status Smoking Status 152 lb (68.9 kg) 98% 27.8 kg/m2 Menopause Current Some Day Smoker BMI and BSA Data Body Mass Index Body Surface Area  
 27.8 kg/m 2 1.74 m 2 Preferred Pharmacy Pharmacy Name Phone Alonso Zarate @ [facility], [address] [place] [address] Krishnamary Giles [phone] Your Updated Medication List  
  
   
This list is accurate as of 5/7/18  1:14 PM.  Always use your most recent med list.  
  
  
  
  
 cyclobenzaprine 5 mg tablet Commonly known as:  FLEXERIL Take 5 mg by mouth. hydroCHLOROthiazide 25 mg tablet Commonly known as:  HYDRODIURIL Take 25 mg by mouth daily. metoprolol tartrate 50 mg tablet Commonly known as:  LOPRESSOR 50 mg. OLANZapine 10 mg tablet Commonly known as:  ZyPREXA 10 mg. PAXIL 10 mg tablet Generic drug:  PARoxetine Take  by mouth daily. traZODone 50 mg tablet Commonly known as:  Ivory Radha Take 50 mg by mouth daily. Follow-up Instructions Return in about 3 months (around 8/7/2018). To-Do List   
 05/07/2018 Lab:  AFP WITH AFP-L3% 05/07/2018 Lab:  AMMONIA   
  
 05/07/2018 Lab:  CBC WITH AUTOMATED DIFF   
  
 05/07/2018 Lab:  HCV RNA BY BRONWYN QL,RFLX TO QT   
  
 05/07/2018 Lab:  HEPATIC FUNCTION PANEL   
  
 05/07/2018 Lab:  METABOLIC PANEL, BASIC   
  
 05/07/2018 Lab:  PROTHROMBIN TIME + INR Introducing [facility]! Elena Hollis introduces [employer] patient portal. Now you can access parts of your medical record, email your doctor's office, and request medication refills online. 1. In your internet browser, go to [email or web address] 2. Click on the First Time User? Click Here link in the Sign In box. You will see the New Member Sign Up page. 3. Enter your [employer] Access Code exactly as it appears below. You will not need to use this code after youve completed the sign-up process. If you do not sign up before the expiration date, you must request a new code. · [employer] Access Code: [email or web address] Expires: 8/5/2018  1:14 PM 
 
4. Enter the last four digits of your Social Security Number (xxxx) and Date of Birth (mm/dd/yyyy) as indicated and click Submit. You will be taken to the next sign-up page. 5. Create a [employer] ID. This will be your [employer] login ID and cannot be changed, so think of one that is secure and easy to remember. 6. Create a [employer] password. You can change your password at any time. 7. Enter your Password Reset Question and Answer. This can be used at a later time if you forget your password. 8. Enter your e-mail address. You will receive e-mail notification when new information is available in [address] 9. Click Sign Up. You can now view and download portions of your medical record. 10. Click the Download Summary menu link to download a portable copy of your medical information. If you have questions, please visit the Frequently Asked Questions section of the [employer] website. Remember, [employer] is NOT to be used for urgent needs. For medical emergencies, dial 911. Now available from your iPhone and Android! Please provide this summary of care documentation to your next provider. Your primary care clinician is listed as [employer]. If you have any questions after today's visit, please call [phone].

## 2018-05-07 NOTE — PROGRESS NOTES
[address] [place], [address], [facility], [place]       SAGE Madrigal PA-C Stan Harm, [facility]   SAGE Covarrubias NP        at [address], [address] [facility], [address]     [phone]     FAX: [phone]    at [facility], [address] [place] [address], [address]     [phone]     FAX: [phone]         PCP: Patient Care Team:  Jose Riley DO as PCP - General (Family Practice)    Problem List  Date Reviewed: 11/6/2017          Codes Class Noted    Cholangiocarcinoma ([facility].) (Chronic) ICD-10-CM: C22.1  ICD-9-CM: 155.1  6/29/2016        HTN (hypertension) ICD-10-CM: I10  ICD-9-CM: 401.9  6/15/2016        Depression ICD-10-CM: F32.9  ICD-9-CM: 853  6/15/2016        HCV (hepatitis C virus) (Chronic) ICD-10-CM: B19.20  ICD-9-CM: 070.70  6/15/2016        Primary malignant neoplasm of liver ([facility].) ICD-10-CM: C22.8  ICD-9-CM: 155.0  6/15/2016              Xavi Teresa returns regarding cholangiocarcinoma vs mixed unspecified type liver cancer and its management. She also has HCV. The patient began HCV with once daily Zepatier. She is to be treated for 12 weeks total.  There seems to have been an interruption in her therapy and she may have missed as much as 7 days of treatment. She began her 3 packet of 14 tablets on 11/05/2017. The patient now states that she only missed \"2 or 3 days, because they didn't deliver it to me\". The patient is a 58y.o. year old Black female with history of hepatitis C with evidence of cirrhosis on biopsy 2/2016. She was briefly treated with interferon over 15 years ago but did not tolerate it. Imaging via MRI  Feb 2016 demonstrated 5.2 x 2.8 cm cm right hepatic mass. The lesion was not classic for [address]   This was biopsied and determined to be a poorly differentiated cholangiocarcinoma vs. Hep Par 1 negative HCC. There was also cirrhosis noted on this biopsy. In August of 2016 she had the tumor removed. Surgical margins were clear. Gallbladder was also removed. She was lost to follow up post operatively. She then went to see a GI closer to home to be treated for HCV and apparently was denied for uncertain reason. The patient has not developed edema. The patient has not developed hepatic encephalopathy. The patient has not had been evaluated for varices. The most recent laboratory studies indicate that the liver transaminases are normal, alkaline phosphatase is normal, tests of hepatic synthetic and metabolic function are normal, and the platelet count is normal.  She did not get the ordered labs drawn after her last appointment. The patient has no symptoms which could be attributed to the liver disorder. The patient completes all daily activities without any functional limitations. The patient has not experienced fatigvers, chills, shortness of breath, chest pain, pain in the right side over the liver, diffuse abdominal pain, nausea, vomiting, constipation, diarrhea, dry eyes, dry mouth, arthralgias, myalgias, yellowing of the eyes or skin, itching, dark urine, problems concentrating, swelling of the abdomen, swelling of the lower extremities, hematemesis, hematochezia. ALLERGIES  Allergies   Allergen Reactions    Lipitor [Atorvastatin] Swelling       MEDICATIONS  Current Outpatient Prescriptions   Medication Sig    metoprolol tartrate (LOPRESSOR) 50 mg tablet 50 mg.    OLANZapine (ZYPREXA) 10 mg tablet 10 mg.    PARoxetine (PAXIL) 10 mg tablet Take  by mouth daily.  hydrochlorothiazide (HYDRODIURIL) 25 mg tablet Take 25 mg by mouth daily.  traZODone (DESYREL) 50 mg tablet Take 50 mg by mouth daily.  cyclobenzaprine (FLEXERIL) 5 mg tablet Take 5 mg by mouth. No current facility-administered medications for this visit.         SYSTEM REVIEW NOT RELATED TO LIVER DISEASE OR REVIEWED ABOVE:  Constitution systems: Negative for fever, chills, weight gain, weight loss. Eyes: Negative for visual changes. ENT: Negative for sore throat, painful swallowing. Respiratory: Negative for cough, hemoptysis, SOB. Cardiology: Negative for chest pain, palpitations. GI:  Negative for constipation or diarrhea. : Negative for urinary frequency, dysuria, hematuria, nocturia. Skin: Negative for rash. Hematology: Negative for easy bruising, blood clots. Musculo-skelatal: Negative for back pain, muscle pain, weakness. Neurologic: Negative for headaches, dizziness, vertigo, memory problems not related to HE. Psychology: Negative for anxiety, depression. FAMILY HISTORY:  The father Passed away. No knowledge of medical histry  The mother passed from kidney disease. There is no family history of liver disease. SOCIAL HISTORY:  The patient is . The patient has 5 children. The patient smokes 3-4 cigarettes/day. The patient is retired. She does get social security. PHYSICAL EXAMINATION:  Visit Vitals    BP (!) 161/103 (BP 1 Location: Right arm, BP Patient Position: Sitting)    Pulse (!) 53    Temp 98.3 °F (36.8 °C) (Tympanic)    Resp 18    Ht 5' 2\" (1.575 m)    Wt 152 lb (68.9 kg)    SpO2 98%    BMI 27.8 kg/m2     General: No acute distress. Eyes: Sclera anicteric. ENT: No oral lesions. Thyroid normal.  Nodes: No adenopathy. Skin: No spider angiomata. No jaundice. No palmar erythema. Respiratory: Lungs clear to auscultation. Cardiovascular: Regular heart rate. No murmurs. No JVD. Abdomen: Soft non-tender. Liver size normal to percussion/palpation. Spleen not palpable. No obvious ascites. Extremities: No edema. No muscle wasting. No gross arthritic changes. Neurologic: Alert and oriented. Cranial nerves grossly intact. No asterixis.     LABORATORY STUDIES:  Liver [place] [address] & Units 9/21/2017   WBC 3.4 - 10.8 x10E3/uL 7.3   ANC 1.4 - 7.0 x10E3/uL 4.6   HGB 11.1 - 15.9 g/dL 13.4   HGB 12 - 16 G/DL    HGB (iSTAT) 12 - 16 G/DL     - 379 x10E3/uL 222   INR 0.8 - 1.2    AST 0 - 40 IU/L 21   ALT 0 - 32 IU/L 13   Alk Phos 39 - 117 IU/L 78   Bili, Total 0.0 - 1.2 mg/dL 0.3   Bili, Direct 0.00 - 0.40 mg/dL 0.10   Albumin 3.6 - 4.8 g/dL 4.0   BUN 8 - 27 mg/dL 15   BUN (iSTAT) 7 - 18 MG/DL    Creat 0.57 - 1.00 mg/dL 1.05 (H)   Creat (iSTAT) 0.6 - 1.3 MG/DL    Na 134 - 144 mmol/L 141   K 3.5 - 5.2 mmol/L 3.8   Cl 96 - 106 mmol/L 101   CO2 18 - 29 mmol/L 23   Glucose 65 - 99 mg/dL 95   Ammonia 11 - 32 UMOL/L      Liver [place] [facility] Latest Ref Rng & Units 4/25/2017 2/17/2017   WBC 3.4 - 10.8 x10E3/uL 7.8 7.6   ANC 1.4 - 7.0 x10E3/uL 4.8 4.4   HGB 11.1 - 15.9 g/dL 14.8 13.6   HGB 12 - 16 G/DL     HGB (iSTAT) 12 - 16 G/DL      - 379 x10E3/uL 192 200   INR 0.8 - 1.2 1.1 1.0   AST 0 - 40 IU/L 29 32   ALT 0 - 32 IU/L 32 32   Alk Phos 39 - 117 IU/L 78 88   Bili, Total 0.0 - 1.2 mg/dL 0.4 0.3   Bili, Direct 0.00 - 0.40 mg/dL 0.12 0.11   Albumin 3.6 - 4.8 g/dL 4.2 3.8   BUN 8 - 27 mg/dL 12 14   BUN (iSTAT) 7 - 18 MG/DL     Creat 0.57 - 1.00 mg/dL 1.00 1.01 (H)   Creat (iSTAT) 0.6 - 1.3 MG/DL     Na 134 - 144 mmol/L 143 140   K 3.5 - 5.2 mmol/L 3.7 3.7   Cl 96 - 106 mmol/L 103 101   CO2 18 - 29 mmol/L 24 24   Glucose 65 - 99 mg/dL 85 74   Ammonia 11 - 32 UMOL/L       Cancer Screening Latest Ref Rng & Units 4/25/2017 2/17/2017  6/15/2016   AFP, Serum 0.0 - 8.0 ng/mL 6.0 5.2  571.5 (H)   AFP-L3% 0.0 - 9.9 % 5.6 6.1  86.4 (H)   CA 19-9 0 - 35 U/mL 24 22  24   CEA 0.0 - 4.7 ng/mL    2.9     SEROLOGIES:  Serologies Latest Ref Rng & Units 2/17/2017   Hep A Ab, Total Negative Negative   Hep B Surface Ag Negative Negative   Hep B Core Ab, Total Negative Positive (A)   Hep B Surface AB QL  Reactive   Hep C Genotype  1a   HCV RT-PCR, Quant IU/mL [id number]     LIVER HISTOLOGY:  02/2016 Liver biopsy POORLY DIFFERENTIATED CHOLANGIOCARCINOMA. Positive for cirrhosis. Slides not reviewed by Dr. Carly Payne. ENDOSCOPIC PROCEDURES:  03/2017. EGD by Dr. Nina Fenton. Esophagus is normal.  pathology is negative. Repeat in 03/2019. RADIOLOGY:  02/2016 MRI. Inferior right hepatic lobe mass with imaging features that are not entirely specific. This is a hypovascular lesion with intrahepatic cholangiocarcinoma and hypovascular metastasis as primary differential possibilities. This lesion was biopsied previously on 1/28/2016.   06/2016. MRI w/ MRCP w/wo contrast.  Stable mass in the inferior right hepatic lobe. Nataliia hepatis and right cardiophrenic lymph nodes are also unchanged. No new findings to suggest progressive/metastatic disease. 02/2017. Abdominal MRI w/wo contrast.  Surgically truncated inferior margin of the right liver lobe which was the site of the tumor. No residual or new enhancing mass lesion. 08/2017. Abdominal MRI w/wo contrast. Status post partial right hepatectomy. No recurrent or new liver nodules or masses identified. 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 The most recent laboratory studies indicate that the liver transaminases are normal, alkaline phosphatase is normal, tests of hepatic synthetic and metabolic function are   normal, and the platelet count is normal.  Will perform laboratory testing to monitor liver function and degree of liver injury. This will include hepatic panel, a CBC w/ diff, a BMP, a PT/INR, an AFP-L3%, an ammonia level, and an HCV RNA. Complications of cirrhosis were discussed in detail. We discussed thrombocytopenia, portal hypertension, varices, GI bleeding, peripheral edema, ascites, hepatic encephalopathy, and hepatocellular carcinoma.  We discussed the need for follow ups on a regular basis, at 3 month intervals to monitor for complications. We discussed the need for every 6 month liver imaging studies. She is s/p hepatic wedge resection of the tumor in 8/2016. Follow up imaging has been performed and no residual or new enhancing mass lesion was found. Repeat MRI Q6 months to screen for HCC/cholanigiocarcinomas. Patient does have cirrhosis mentioned on biopsy though her liver function appears well preserved. The patient has genotype 1A and has previously failed interferon based therapy over 15 years ago. The patient began HCV in August or September, 2017 with once daily Zepatier. She was to be treated for 12 weeks total.  There seems to have been an interruption in her therapy and she may have missed as much as 7 days of treatment. She began her 3 packet of 14 tablets again on 11/05/2017. She was virus undetectable in 09/2017. She is a poor historian. I am not sure how adherent to the treatment regime she has been. The patient now states that she only missed \"2 or 3 days, because they didn't deliver it to me\". EGD was recently performed and her she has a normal esophagus. This needs to be repeated in 03/2019. All of the above issues were discussed with the patient. All questions were answered. The patient expressed a clear understanding of the above. [facility] 87 in 3 months.       Gabriel Colorado, SAGE   Liver [place] of [address] [place] [address], [address] [place] [address] Lilibeth Lugo, [address] [facility]   [phone]

## 2018-05-07 NOTE — PROGRESS NOTES
Devante Travis is a 58 y.o. female    No chief complaint on file. 1. Have you been to the ER, urgent care clinic or hospitalized since your last visit? NO.     2. Have you seen or consulted any other health care providers outside of the [address] [place] [address] since your last visit (Include any pap smears or colon screening)?  NO          Learning Assessment 5/7/2018   PRIMARY LEARNER Patient   BARRIERS PRIMARY LEARNER NONE   CO-LEARNER CAREGIVER No   PRIMARY LANGUAGE ENGLISH   LEARNER PREFERENCE PRIMARY LISTENING   ANSWERED BY PATIENT   RELATIONSHIP SELF

## 2018-05-11 ENCOUNTER — TELEPHONE (OUTPATIENT)
Dept: HEMATOLOGY | Age: 63
End: 2018-05-11

## 2018-05-11 NOTE — TELEPHONE ENCOUNTER
Updated on recent imaging via answering machine message. No hepatic masses. It does not appear that the patient had her labs drawn after her appointment here on 05/07/2018. Asked that she have these drawn so we can see if she cured of the HCV. Follow up as scheduled.

## 2018-06-08 LAB
A-G RATIO,AGRAT: 1.1 RATIO (ref 1.1–2.6)
ABSOLUTE LYMPHOCYTE COUNT, 10803: 2.1 K/UL (ref 1–4.8)
ALBUMIN SERPL-MCNC: 4.2 G/DL (ref 3.5–5)
ALP SERPL-CCNC: 71 U/L (ref 40–120)
ALT SERPL-CCNC: 15 U/L (ref 5–40)
AMMONIA, PLASMA, 007054: 49 MCG/DL (ref 19–65)
ANION GAP SERPL CALC-SCNC: 10 MMOL/L
AST SERPL W P-5'-P-CCNC: 23 U/L (ref 10–37)
BASOPHILS # BLD: 0 K/UL (ref 0–0.2)
BASOPHILS NFR BLD: 0 % (ref 0–2)
BILIRUB SERPL-MCNC: 0.6 MG/DL (ref 0.2–1.2)
BILIRUBIN, DIRECT,CBIL: <0.2 MG/DL (ref 0–0.3)
BUN SERPL-MCNC: 12 MG/DL (ref 6–22)
CALCIUM SERPL-MCNC: 9.3 MG/DL (ref 8.4–10.5)
CHLORIDE SERPL-SCNC: 105 MMOL/L (ref 98–110)
CO2 SERPL-SCNC: 28 MMOL/L (ref 20–32)
CREAT SERPL-MCNC: 0.9 MG/DL (ref 0.8–1.4)
EOSINOPHIL # BLD: 0.1 K/UL (ref 0–0.5)
EOSINOPHIL NFR BLD: 2 % (ref 0–6)
ERYTHROCYTE [DISTWIDTH] IN BLOOD BY AUTOMATED COUNT: 13.2 % (ref 10–15.5)
GFRAA, 66117: >60
GFRNA, 66118: 59.4
GLOBULIN,GLOB: 4 G/DL (ref 2–4)
GLUCOSE SERPL-MCNC: 88 MG/DL (ref 70–99)
GRANULOCYTES,GRANS: 61 % (ref 40–75)
HCT VFR BLD AUTO: 46.5 % (ref 35.1–48)
HGB BLD-MCNC: 15.3 G/DL (ref 11.7–16)
INR PPP: 1 (ref 0.89–1.29)
LYMPHOCYTES, LYMLT: 29 % (ref 20–45)
MCH RBC QN AUTO: 30 PG (ref 26–34)
MCHC RBC AUTO-ENTMCNC: 33 G/DL (ref 31–36)
MCV RBC AUTO: 92 FL (ref 80–95)
MONOCYTES # BLD: 0.6 K/UL (ref 0.1–1)
MONOCYTES NFR BLD: 8 % (ref 3–12)
NEUTROPHILS # BLD AUTO: 4.4 K/UL (ref 1.8–7.7)
PLATELET # BLD AUTO: 203 K/UL (ref 140–440)
PMV BLD AUTO: 10.5 FL (ref 9–13)
POTASSIUM SERPL-SCNC: 3.9 MMOL/L (ref 3.5–5.5)
PROT SERPL-MCNC: 8.2 G/DL (ref 6.2–8.1)
PROTHROMBIN TIME: 10.4 SEC (ref 9–13)
RBC # BLD AUTO: 5.06 M/UL (ref 3.8–5.2)
SODIUM SERPL-SCNC: 143 MMOL/L (ref 133–145)
WBC # BLD AUTO: 7.2 K/UL (ref 4–11)

## 2018-06-10 LAB — HCV RNA QUAL PCR,9951751: NEGATIVE

## 2018-06-14 LAB
AFP, SERUM, 141303: 5.7 NG/ML
AFP-L3%, SERUM, 141302: NORMAL %

## 2018-08-21 ENCOUNTER — TELEPHONE (OUTPATIENT)
Dept: HEMATOLOGY | Age: 63
End: 2018-08-21

## 2019-02-28 ENCOUNTER — DOCUMENTATION ONLY (OUTPATIENT)
Dept: FAMILY MEDICINE CLINIC | Age: 64
End: 2019-02-28

## 2019-02-28 ENCOUNTER — OFFICE VISIT (OUTPATIENT)
Dept: FAMILY MEDICINE CLINIC | Age: 64
End: 2019-02-28

## 2019-02-28 VITALS
HEIGHT: 62 IN | SYSTOLIC BLOOD PRESSURE: 196 MMHG | BODY MASS INDEX: 26.17 KG/M2 | DIASTOLIC BLOOD PRESSURE: 110 MMHG | HEART RATE: 67 BPM | TEMPERATURE: 97.1 F | WEIGHT: 142.2 LBS | OXYGEN SATURATION: 100 % | RESPIRATION RATE: 18 BRPM

## 2019-02-28 DIAGNOSIS — Z00.00 ENCOUNTER FOR MEDICAL EXAMINATION TO ESTABLISH CARE: Primary | ICD-10-CM

## 2019-02-28 NOTE — PROGRESS NOTES
2/28/2019  4:05 PM    Chief Complaint   Patient presents with   [address] [place] [address] Establish Care       Here today as a new patient. Patient roomed by Andressa Ball LPN. Jan unable to complete entire rooming process due to patient refusal to answer most questions and raising voice. Billy Wilson gave me the information she was able to get during rooming. Entered room to find patient discussing with PSR that she did not want anyone to have access to her information. Sat down on stool and introduced self to patient- at that time, she received a phone call and answered the phone. Waited for patient to get off phone- she stated to the caller Audra Carolina is young, you can just come get me now. \" When I asked patient if she prefers that I come back when she is done with the call, she states \"no, you just stay right there, I am not done with you. \" She ended the call after about 1-2 minutes and then told me she needed to go to the bathroom and it could not wait. Showed patient where bathroom was. Early patient walking down [address] and as I rounded the corner, she asked clinical staff \"where that nurse at? I need to be seen immediately. \" Staff reported that I was coming and at that time I walked into her room again. She immediately began raising her voice about how it was inappropriate to ask someone their medical history like the nurse did and that she had to wait to be seen because she was late to the appointment. Attempted to de-escalate patient by explaining that we try to see patients as soon as we can and that a medical history is necessary to provide care but she interrupted and reports that she feels \"judged because I am young. \" Attempted to again discuss with patient about getting the visit started by gathering her medical history and reviewing her concerns, but she again interrupted and reports that I did not shake her hand when I walked into the room so that was disrespectful- apologized that she felt that way and reminded that it is flu season so I try not to spread any germs. One final attempt made to discuss history with patient so that visit could be started and she says Martha Rg is the past and it is over and down with, so I am not going to talk about it. \"     At this point, I advised the patient that I am not able to proceed with the visit without being able to discuss her medical history as this is the only way that I can provide safe and effective care. I got up from computer and went to leave out door and in passing patient she reached out to grab paper that was in my hand, but I was able to keep the paper that was in my hand that pertained to her past medical history. Opened door and requested patient to leave- stated that she was not going anywhere until she was given pain medication and something for anxiety. Again explained to patient that nothing will be prescribed and that a therapeutic relationship can not be achieved. She was standing in the hallway yelling that she was not leaving- explained to patient that if she did not leave, I would call the police. She then began walking down the hallway yelling that I did nothing for her and then exited the building talking on her phone. Due to hostile behavior and yelling, I recommend discharge from practice. Discussed with Kindra Salmeron, , and Dr Tiffany Guzman, collaborating physician.

## 2019-02-28 NOTE — PROGRESS NOTES
OFFICE NOTE Zain Zuluaga is a 61 y.o. female presenting today for office visit. 2/28/2019 
4:04 PM 
 
Chief Complaint Patient presents with [facility] Establish Care HPI: Here today as a new patient. HTN:  
 
 
Review of Systems PHQ Screening 3 most recent PHQ Screens 6/15/2016 Little interest or pleasure in doing things Nearly every day Feeling down, depressed, irritable, or hopeless Nearly every day Total Score PHQ 2 6 History Past Medical History:  
Diagnosis Date  Cirrhosis of liver ([address]  Hepatitis C   
 Hypertension Past Surgical History:  
Procedure Laterality Date  COLONOSCOPY N/A 8/12/2016 COLONOSCOPY with polypectomy and biopsy performed by Yoav Maddox MD at [address]  08/2016  HX OTHER SURGICAL Liver mass removal  
 
 
Social History Socioeconomic History  Marital status:  Spouse name: Not on file  Number of children: Not on file  Years of education: Not on file  Highest education level: Not on file Social Needs  Financial resource strain: Not on file  Food insecurity - worry: Not on file  Food insecurity - inability: Not on file  Transportation needs - medical: Not on file  Transportation needs - non-medical: Not on file Occupational History  Not on file Tobacco Use  Smoking status: Current Some Day Smoker Packs/day: 0.33  Smokeless tobacco: Never Used Substance and Sexual Activity  Alcohol use: No  
  Alcohol/week: 0.0 oz  Drug use: Not on file  Sexual activity: Not on file Other Topics Concern  Not on file Social History Narrative  Not on file No family history on file. Allergies Allergen Reactions  Lipitor [Atorvastatin] Swelling Current Outpatient Medications Medication Sig Dispense Refill  metoprolol tartrate (LOPRESSOR) 50 mg tablet 50 mg.  PARoxetine (PAXIL) 10 mg tablet Take  by mouth daily.  hydrochlorothiazide (HYDRODIURIL) 25 mg tablet Take 25 mg by mouth daily.  OLANZapine (ZYPREXA) 10 mg tablet 10 mg.    
 traZODone (DESYREL) 50 mg tablet Take 50 mg by mouth daily.  cyclobenzaprine (FLEXERIL) 5 mg tablet Take 5 mg by mouth. Advance Care Planning:  
Patient was offered the opportunity to discuss advance care planning {YES/NO:85915} Does patient have an Advance Directive:  {YES/NO:25404} If no, did you provide information on C[facility] Connections? {YES/NO:53862} Patient Care Team: 
Patient Care Team: 
Alena Dennis DO as PCP - [facility]) LABS: 
 
CBC (09/21/2017 1:52 PM EDT) CBC (09/21/2017 1:52 PM EDT) Component Value Ref Range Performed At Pathologist Signature WBC x 10*3 7.7 4.0 - 11.0 K/uL [facility]    
RBC x 10^6 4.66 3.80 - 5.20 M/uL [facility]    
HGB 14.0 11.7 - 16.0 g/dL [facility]    
HCT 42.3 35.1 - 48.0 % [facility]    
MCV 91 80 - 95 fL [facility]    
MCH 30 26 - 34 pg [facility]    
MCHC 33 32 - 36 g/dL [facility]    
RDW 13.6 10.0 - 16.0 % [facility]    
Platelet 227 035 - 265 K/uL [facility]    
MPV 10.9 (H) 6.0 - 10.8 fL [facility]    
 
  
TSH (09/21/2017 1:52 PM EDT) TSH (09/21/2017 1:52 PM EDT) Component Value Ref Range Performed At Pathologist Signature TSH 0.927 0.450 - 4.500 uIU/mL [facility]    
 
  
LIPID COMPLETE PANEL (09/21/2017 1:52 PM EDT) LIPID COMPLETE PANEL (09/21/2017 1:52 PM EDT) Component Value Ref Range Performed At Pathologist Signature Cholesterol, Total 243 (H) 100 - 199 mg/dL [facility]    
Triglyceride 119 0 - 149 mg/dL [facility]    
Total HDL Cholesterol 63 >39 mg/dL [facility]    
VLDL CHOLESTEROL ALISA 24 5 - 40 mg/dL [facility]    
LDL Cholesterol Calculation 156 (H) 0 - 99 mg/dL [facility]    
 
  
HGB A1C WITH EST AVG GLUCOSE (09/21/2017 1:52 PM EDT) HGB A1C WITH EST AVG GLUCOSE (09/21/2017 1:52 PM EDT) Component Value Ref Range Performed At Pathologist Signature Hemoglobin A1C 5.6 Comment:  
         Pre-diabetes: 5.7 - 6.4 
         Diabetes: >6.4 
         Glycemic control for adults with diabetes: <7.0 
 4.8 - 5.6 % [facility]    
Estimated Average Glucose 114 mg/dL [facility]    
 
  
COMPREHENSIVE METABOLIC PANEL (16/17/1389 1:52 PM EDT) COMPREHENSIVE METABOLIC PANEL (02/24/8223 1:52 PM EDT) Component Value Ref Range Performed At Pathologist Signature Glucose  93 65 - 99 mg/dL [facility]    
BUN 14 8 - 27 mg/dL [facility]    
CREATININE 1.15 (H) 0.57 - 1.00 mg/dL [facility]    
EGFR IF NONAFRICAN AM 51 (L) >59 mL/min/1.73 [facility]    
EGFR IF AFRICAN AM 59 (L) >59 mL/min/1.73 [facility]    
BUN/CREATININE RATIO 12 12 - 28 [facility]    
SODIUM 141 134 - 144 mmol/L [facility]    
Potassium 4.1 3.5 - 5.2 mmol/L [facility]    
CHLORIDE 101 96 - 106 mmol/L [facility]    
CO2 23 18 - 29 mmol/L [facility]    
CALCIUM 9.7 8.7 - 10.3 mg/dL [facility]    
Protein, Total 8.2 6.0 - 8.5 g/dL [facility]    
Serum Albumin 4.0 3.6 - 4.8 g/dL [facility]    
GLOBULIN SERUM 4.2 1.5 - 4.5 g/dL [facility]    
A/G Ratio Serum 1.0 (L) 1.2 - 2.2 [facility]    
BILIRUBIN TOTAL 0.4 0.0 - 1.2 mg/dL [facility]    
Alkaline Phosphatase 80 39 - 117 IU/L [facility]    
SGOT (AST) 25 0 - 40 IU/L [facility]    
SGPT (ALT) 16 0 - 32 IU/L [facility]   
 
 
 
RADIOLOGY: 
 
CERVICAL SPINE COMPLETE1/31/2019 [address] Result Impression 1. Minimal anterior listhesis C7-T1. Severe multilevel spondylosis, degenerative disc disease and foraminal stenosis. SHOULDER COMPLETE RT1/31/2019 [address] Result Impression Small humeral head osteophyte. No additional abnormality. SHOULDER COMPLETE LT1/31/2019 [address] Result Impression No significant abnormality. EKG 12 LEAD UNIT HMCAXPPRC33/12/2018 [address] Component Name Value Ref Range Heart Rate 58 bpm  
RR Interval 1,034 ms Atrial Rate 59 ms  
P-R Interval 196 ms P Duration 108 ms P Horizontal Axis   deg P Front Axis 7 deg Q Onset 506 ms QRSD Interval 75 ms QT Interval 472 ms QTcB 464 ms QTcF 467 ms QRS Horizontal Axis -9 deg QRS Axis 24 deg I-40 Front Axis 75 deg  
t-40 Horizontal Axis -33 deg T-40 Front Axis -8 deg T Horizontal Axis 99 deg T Wave Axis 40 deg S-T Horizontal Axis 110 deg S-T Front Axis 118 deg Impression - ABNORMAL ECG -    
Impression SR-Sinus rhythm-normal P axis, V-rate 50-99    
Impression LVHPRE-Probable LVH with secondary repol abnrm-multiple LVH criteria    
Impression -nsst changes compared to 2016-   
 
CHEST PA AND LOLESIY87/12/2018 [facility] Result Impression No acute cardiopulmonary disease. Physical Exam 
 
 
There were no vitals filed for this visit. Assessment and Plan There are no diagnoses linked to this encounter. *Plan of care reviewed with patient. Patient in agreement with plan and expresses understanding. All questions answered and patient encouraged to call or RTO if further questions or concerns. Follow-up Disposition: Not on File

## 2019-02-28 NOTE — PROGRESS NOTES
Chief Complaint   Patient presents with   [address] [place] [address]     1. Have you been to the ER, urgent care clinic since your last visit? Hospitalized since your last visit? No    2. Have you seen or consulted any other health care providers outside of the [address] [place] [address] since your last visit? Include any pap smears or colon screening.  No

## 2019-03-25 ENCOUNTER — OFFICE VISIT (OUTPATIENT)
Dept: FAMILY MEDICINE CLINIC | Age: 64
End: 2019-03-25

## 2019-03-25 ENCOUNTER — HOSPITAL ENCOUNTER (OUTPATIENT)
Dept: LAB | Age: 64
Discharge: HOME | End: 2019-03-25
Payer: MEDICAID

## 2019-03-25 VITALS
DIASTOLIC BLOOD PRESSURE: 97 MMHG | TEMPERATURE: 98.6 F | RESPIRATION RATE: 16 BRPM | OXYGEN SATURATION: 94 % | SYSTOLIC BLOOD PRESSURE: 159 MMHG | HEIGHT: 62 IN | HEART RATE: 87 BPM | BODY MASS INDEX: 25.21 KG/M2 | WEIGHT: 137 LBS

## 2019-03-25 DIAGNOSIS — K74.60 CIRRHOSIS OF LIVER WITHOUT ASCITES, UNSPECIFIED HEPATIC CIRRHOSIS TYPE (HCC): ICD-10-CM

## 2019-03-25 DIAGNOSIS — R51.9 NEW ONSET HEADACHE: ICD-10-CM

## 2019-03-25 DIAGNOSIS — C22.1 CHOLANGIOCARCINOMA (HCC): ICD-10-CM

## 2019-03-25 DIAGNOSIS — F32.A ANXIETY AND DEPRESSION: ICD-10-CM

## 2019-03-25 DIAGNOSIS — F41.9 ANXIETY AND DEPRESSION: ICD-10-CM

## 2019-03-25 DIAGNOSIS — F51.01 PRIMARY INSOMNIA: ICD-10-CM

## 2019-03-25 DIAGNOSIS — Z12.31 ENCOUNTER FOR SCREENING MAMMOGRAM FOR BREAST CANCER: ICD-10-CM

## 2019-03-25 DIAGNOSIS — I10 ESSENTIAL HYPERTENSION: ICD-10-CM

## 2019-03-25 DIAGNOSIS — I10 ESSENTIAL HYPERTENSION: Primary | ICD-10-CM

## 2019-03-25 PROBLEM — K76.82 HEPATIC ENCEPHALOPATHY: Status: ACTIVE | Noted: 2017-05-21

## 2019-03-25 PROBLEM — K76.6 PORTAL HYPERTENSION (HCC): Status: ACTIVE | Noted: 2017-05-21

## 2019-03-25 LAB
ALBUMIN SERPL-MCNC: 3.9 G/DL (ref 3.4–5)
ALBUMIN/GLOB SERPL: 1 {RATIO} (ref 0.8–1.7)
ALP SERPL-CCNC: 78 U/L (ref 45–117)
ALT SERPL-CCNC: 22 U/L (ref 13–56)
ANION GAP SERPL CALC-SCNC: 8 MMOL/L (ref 3–18)
AST SERPL-CCNC: 25 U/L (ref 15–37)
BILIRUB SERPL-MCNC: 0.6 MG/DL (ref 0.2–1)
BUN SERPL-MCNC: 16 MG/DL (ref 7–18)
BUN/CREAT SERPL: 15 (ref 12–20)
CALCIUM SERPL-MCNC: 8.7 MG/DL (ref 8.5–10.1)
CHLORIDE SERPL-SCNC: 109 MMOL/L (ref 100–108)
CHOLEST SERPL-MCNC: 225 MG/DL
CO2 SERPL-SCNC: 26 MMOL/L (ref 21–32)
CREAT SERPL-MCNC: 1.04 MG/DL (ref 0.6–1.3)
GLOBULIN SER CALC-MCNC: 3.9 G/DL (ref 2–4)
GLUCOSE SERPL-MCNC: 91 MG/DL (ref 74–99)
HDLC SERPL-MCNC: 77 MG/DL (ref 40–60)
HDLC SERPL: 2.9 {RATIO} (ref 0–5)
LDLC SERPL CALC-MCNC: 133.4 MG/DL (ref 0–100)
LIPID PROFILE,FLP: ABNORMAL
POTASSIUM SERPL-SCNC: 3.5 MMOL/L (ref 3.5–5.5)
PROT SERPL-MCNC: 7.8 G/DL (ref 6.4–8.2)
SODIUM SERPL-SCNC: 143 MMOL/L (ref 136–145)
TRIGL SERPL-MCNC: 73 MG/DL (ref ?–150)
VLDLC SERPL CALC-MCNC: 14.6 MG/DL

## 2019-03-25 PROCEDURE — 80053 COMPREHEN METABOLIC PANEL: CPT

## 2019-03-25 PROCEDURE — 80061 LIPID PANEL: CPT

## 2019-03-25 PROCEDURE — 36415 COLL VENOUS BLD VENIPUNCTURE: CPT

## 2019-03-25 RX ORDER — METOPROLOL TARTRATE 50 MG/1
50 TABLET ORAL 2 TIMES DAILY
Qty: 180 TAB | Refills: 1 | Status: SHIPPED | OUTPATIENT
Start: 2019-03-25 | End: 2019-07-30 | Stop reason: SDUPTHER

## 2019-03-25 RX ORDER — DIAZEPAM 2 MG/1
TABLET ORAL
Refills: 0 | COMMUNITY
Start: 2019-02-04 | End: 2019-03-25

## 2019-03-25 RX ORDER — QUETIAPINE FUMARATE 25 MG/1
25 TABLET, FILM COATED ORAL
Qty: 90 TAB | Refills: 1 | Status: SHIPPED | OUTPATIENT
Start: 2019-03-25 | End: 2019-07-30

## 2019-03-25 RX ORDER — HYDROCHLOROTHIAZIDE 25 MG/1
25 TABLET ORAL DAILY
Qty: 90 TAB | Refills: 1 | Status: SHIPPED | OUTPATIENT
Start: 2019-03-25 | End: 2019-07-30 | Stop reason: ALTCHOICE

## 2019-03-25 RX ORDER — BUSPIRONE HYDROCHLORIDE 10 MG/1
10 TABLET ORAL 3 TIMES DAILY
Qty: 90 TAB | Refills: 4 | Status: SHIPPED | OUTPATIENT
Start: 2019-03-25 | End: 2019-07-30 | Stop reason: SDUPTHER

## 2019-03-25 NOTE — PROGRESS NOTES
HISTORY OF PRESENT ILLNESS Alexa Biggs is a 61 y.o. female. Patient presents for sleep issues. HPI Headaches on left side that last 15-20 minutes. These started 3 months ago Depression. Pt has a relative that  of an aneurysm. Pt notes her vision has changed. Pt has stopped all her medications. Pt would like something for her anxiety and sleep. Review of Systems Constitutional: Positive for malaise/fatigue. HENT: Negative. Eyes: Negative. Respiratory: Negative. Cardiovascular: Negative. Gastrointestinal: Negative. Genitourinary: Negative. Neurological: Positive for headaches. Psychiatric/Behavioral: Positive for depression. The patient is nervous/anxious and has insomnia. Visit Vitals BP (!) 159/97 (BP 1 Location: Left arm, BP Patient Position: Sitting) Pulse 87 Temp 98.6 °F (37 °C) (Oral) Resp 16 Ht 5' 2\" (1.575 m) Wt 137 lb (62.1 kg) SpO2 94% BMI 25.06 kg/m² Physical Exam  
Constitutional: She is oriented to person, place, and time. She appears well-developed. No distress. Eyes: Pupils are equal, round, and reactive to light. Conjunctivae and EOM are normal.  
Neck: Normal range of motion. Neck supple. Cardiovascular: Normal rate, regular rhythm and normal heart sounds. No murmur heard. Pulmonary/Chest: Effort normal and breath sounds normal. No respiratory distress. She has no wheezes. She has no rales. Musculoskeletal: Normal range of motion. Neurological: She is alert and oriented to person, place, and time. Psychiatric: She has a normal mood and affect. Her behavior is normal. Judgment and thought content normal.  
 
 
ASSESSMENT and PLAN 
  ICD-10-CM ICD-9-CM 1. Essential hypertension I10 401.9 hydroCHLOROthiazide (HYDRODIURIL) 25 mg tablet  
   metoprolol tartrate (LOPRESSOR) 50 mg tablet LIPID PANEL  
   METABOLIC PANEL, COMPREHENSIVE 2. Cholangiocarcinoma (HCC) C22.1 155.1 3. Cirrhosis of liver without ascites, unspecified hepatic cirrhosis type (HCC) K74.60 571.5 4. Anxiety and depression F41.9 300.00 busPIRone (BUSPAR) 10 mg tablet F32.9 311 5. Primary insomnia F51.01 307.42 QUEtiapine (SEROQUEL) 25 mg tablet 6. New onset headache R51 784.0 MRI BRAIN W WO CONT 7. Encounter for screening mammogram for breast cancer Z12.31 V76.12 DEJA MAMMO BI SCREENING INCL CAD PLAN: 
Together we reviewed her problem list, medication list and labs. We discussed her stress and I advised Pt that taking Valium is not an option. Pt given Seroquel to try for sleep. Consider ref to psych. Pt was advised to take the Buspar 10mg twice a day. In two weeks she can take a tablet and half bid. Pt asked to RTC for re-eval in 4 weeks. I have discussed the diagnosis with the patient and the intended plan as seen in the above orders. The patient has received an after-visit summary and questions were answered concerning future plans. I have discussed medication side effects and warnings with the patient as well. Patient will call for further questions. Follow-up and Dispositions · Return in about 1 month (around 4/22/2019) for med check.

## 2019-03-25 NOTE — PROGRESS NOTES
Chief Complaint Patient presents with [facility]  Sleep Problem 1. Have you been to the ER, urgent care clinic since your last visit? Hospitalized since your last visit? No 
 
2. Have you seen or consulted any other health care providers outside of the [address] [place] [address] since your last visit? Include any pap smears or colon screening.  No

## 2019-04-02 ENCOUNTER — TELEPHONE (OUTPATIENT)
Dept: FAMILY MEDICINE CLINIC | Age: 64
End: 2019-04-02

## 2019-04-02 DIAGNOSIS — R51.9 NEW ONSET HEADACHE: Primary | ICD-10-CM

## 2019-04-02 DIAGNOSIS — H53.9 CHANGES IN VISION: ICD-10-CM

## 2019-04-02 NOTE — TELEPHONE ENCOUNTER
Radha Lewis from the  called to let provider know that the patients Brain MRI was denied by the insurance company. He wanted to let us know a peer to peer was available to do. The case # is [id number]. [id number].

## 2019-04-03 ENCOUNTER — TELEPHONE (OUTPATIENT)
Dept: FAMILY MEDICINE CLINIC | Age: 64
End: 2019-04-03

## 2019-04-03 NOTE — TELEPHONE ENCOUNTER
Pippa Ashton with [employer] calling re: Skyler Santana MRI order    Stated it requires a peer to peer review    Track# [id number]     [phone] option 3

## 2019-04-16 ENCOUNTER — OFFICE VISIT (OUTPATIENT)
Dept: ORTHOPEDIC SURGERY | Age: 64
End: 2019-04-16

## 2019-04-16 ENCOUNTER — TELEPHONE (OUTPATIENT)
Dept: ORTHOPEDIC SURGERY | Age: 64
End: 2019-04-16

## 2019-04-16 VITALS
OXYGEN SATURATION: 98 % | RESPIRATION RATE: 16 BRPM | HEART RATE: 51 BPM | TEMPERATURE: 97.3 F | BODY MASS INDEX: 25.03 KG/M2 | DIASTOLIC BLOOD PRESSURE: 106 MMHG | WEIGHT: 136 LBS | SYSTOLIC BLOOD PRESSURE: 156 MMHG | HEIGHT: 62 IN

## 2019-04-16 DIAGNOSIS — M47.812 CERVICAL SPONDYLOSIS WITHOUT MYELOPATHY: Primary | ICD-10-CM

## 2019-04-16 DIAGNOSIS — M50.30 DDD (DEGENERATIVE DISC DISEASE), CERVICAL: ICD-10-CM

## 2019-04-16 RX ORDER — METHYLPREDNISOLONE 4 MG/1
TABLET ORAL
Qty: 1 DOSE PACK | Refills: 0 | Status: SHIPPED | OUTPATIENT
Start: 2019-04-16 | End: 2019-05-30

## 2019-04-16 RX ORDER — IBUPROFEN 800 MG/1
800 TABLET ORAL
Qty: 45 TAB | Refills: 0 | Status: SHIPPED | OUTPATIENT
Start: 2019-04-16 | End: 2020-09-30 | Stop reason: ALTCHOICE

## 2019-04-16 NOTE — LETTER
4/16/19 Patient: Alecia Sabillon YOB: 1955 Date of Visit: 4/16/2019 Magalie Saravia NP 
[address] [facility] [address] [facility] [address] VIA In Basket Dear Magalie Saravia NP, Thank you for referring Ms. Kaitlynn Grider to [address] [place] [address] Drive for evaluation. My notes for this consultation are attached. If you have questions, please do not hesitate to call me. I look forward to following your patient along with you. Sincerely, Ingrid Rodriguez MD

## 2019-04-16 NOTE — TELEPHONE ENCOUNTER
Patient was seen in our office today and her blood pressure was 172/100 and the second reading was 156/106. Patient states she has been following up with you in regards to her blood pressure and she is scheduled for further testing. Patient states she has been having a headache for the past 3 months. Patient has left our office. Please advise. Thank you.

## 2019-04-16 NOTE — PROGRESS NOTES
[facility]  [address], [place]  Phone: [phone]  Fax: [phone]        INITIAL CONSULTATION      HISTORY OF PRESENT ILLNESS:  Vickie Jha is a 61 y.o. female whom is referred from Dr. Verna Montejo secondary to neck and bilateral shoulder pain since 1987. She rates her pain 8/10. Pt denies h/o neck or shoulder surgery or PT. Pt denies neck injections. Had shoulder injections in the past. She self-treats with Ibuprofen without relief. Pt denies dropping things or loss of balance. Pt denies fever, weight loss, or skin changes. Pt denies h/o stomach ulcers or bleeding disorders. PmHx schizophrenia (pt denies despite the fact that she is taking Seroquel), Hepatitis C, cirrhosis. Note from Dr. Verna Montejo dated 2/1/19 indicating patient was seen with c/o bilateral shoulder and neck pain. Referred to us. Cervical spine XR dated 1/31/19 not available for my review. Per report, minimal anterior listhesis C7-T1. Severe multilevel spondylosis, degenerative disc disease and foraminal stenosis. The patient is ambidextrous.  reviewed. Body mass index is 24.87 kg/m². PCP: Lili Stout NP    Past Medical History:   Diagnosis Date    Cirrhosis of liver ([facility]     Hepatitis C     Hypertension           Past Surgical History:   Procedure Laterality Date    COLONOSCOPY N/A 8/12/2016    COLONOSCOPY with polypectomy and biopsy performed by Andrea Bowie MD at [address] HX CHOLECYSTECTOMY  08/2016    HX OTHER SURGICAL      Liver mass removal         Social History     Tobacco Use    Smoking status: Current Some Day Smoker     Packs/day: 0.33    Smokeless tobacco: Never Used   Substance Use Topics    Alcohol use: No     Alcohol/week: 0.0 oz     Work status: The patient is self-employed. Marital status: The patient is .      Current Outpatient Medications   Medication Sig Dispense Refill    hydroCHLOROthiazide (HYDRODIURIL) 25 mg tablet Take 1 Tab by mouth daily. 90 Tab 1    metoprolol tartrate (LOPRESSOR) 50 mg tablet Take 1 Tab by mouth two (2) times a day. 180 Tab 1    QUEtiapine (SEROQUEL) 25 mg tablet Take 1 Tab by mouth nightly. 90 Tab 1    busPIRone (BUSPAR) 10 mg tablet Take 1 Tab by mouth three (3) times daily. 90 Tab 4       Allergies   Allergen Reactions    Lipitor [Atorvastatin] Swelling    Lisinopril Swelling          History reviewed. No pertinent family history. REVIEW OF SYSTEMS  Constitutional symptoms: Negative  Eyes: Negative  Ears, Nose, Throat, and Mouth: Negative  Cardiovascular: Negative  Respiratory: Negative  Genitourinary: Negative  Integumentary (Skin and/or breast): Negative  Musculoskeletal: Positive for neck pain and bilateral shoulder pain  Extremities: Negative for edema. Endocrine/Rheumatologic: Negative  Hematologic/Lymphatic: Negative  Allergic/Immunologic: Negative  Psychiatric: Negative       PHYSICAL EXAMINATION  Visit Vitals  BP (!) 172/100 (BP 1 Location: Left arm)   Pulse (!) 51   Temp 97.3 °F (36.3 °C)   Resp 16   Ht 5' 2\" (1.575 m)   Wt 136 lb (61.7 kg)   SpO2 98%   BMI 24.87 kg/m²       CONSTITUTIONAL: NAD, A&O x 3  HEART: Regular rate and rhythm  ABDOMEN: Positive bowel sounds, soft, nontender, and nondistended  LUNGS: Clear to auscultation bilaterally. SKIN: Negative for rash. RANGE OF MOTION: The patient has full passive range of motion in all four extremities. SENSATION: Sensation is intact to light touch throughout. MOTOR:   Straight Leg Raise: Negative, bilateral  Pollock: Negative, bilateral  Tandem Gait: Neg. Deep tendon reflexes are 2+ at the brachioradialis, biceps, and triceps. Deep tendon reflexes are 2+ at the knees and ankles bilaterally.      Shoulder AB/Flex Elbow Flex Wrist Ext Elbow Ext Wrist Flex Hand Intrin Tone   Right +4/5 +4/5 +4/5 +4/5 +4/5 +4/5 +4/5   Left +4/5 +4/5 +4/5 +4/5 +4/5 +4/5 +4/5              Hip Flex Knee Ext Knee Flex Ankle DF GTE Ankle PF Tone Right +4/5 +4/5 +4/5 +4/5 +4/5 +4/5 +4/5   Left +4/5 +4/5 +4/5 +4/5 +4/5 +4/5 +4/5         ASSESSMENT   Diagnoses and all orders for this visit:    1. Cervical spondylosis without myelopathy    2. DDD (degenerative disc disease), cervical    Other orders  -     REFERRAL TO PHYSICAL THERAPY           IMPRESSIONS/RECOMMENDATIONS:  Patient presents today with c/o neck and bilateral shoulder pain since 1987. I have asked patient to bring in copies of her cervical spine films for my review. I will start her on Medrol Dosepak. I gave her a prescription for Ibuprofen following completion of the Medrol Dosepak. Multiple treatment options were discussed. Patient is neurologically intact. I will see the patient back in 1 month's time or earlier if needed. Written by Ochoa Rubi, as dictated by Steven Hagen MD  I examined the patient, reviewed and agree with the note.

## 2019-04-19 NOTE — TELEPHONE ENCOUNTER
Patient is due for a follow up 4/22 time frame. Attempted to contact patient to schedule an appointment however she declined to schedule at this time she stated she would call back at a later time when she was by her calendar.

## 2019-05-02 ENCOUNTER — DOCUMENTATION ONLY (OUTPATIENT)
Dept: NEUROLOGY | Age: 64
End: 2019-05-02

## 2019-05-02 NOTE — PROGRESS NOTES
Chart review reveals scheduled new patient visit to discuss new onset headaches and changes in vision; referred by Sofía Nunez NP.

## 2019-05-30 ENCOUNTER — HOSPITAL ENCOUNTER (OUTPATIENT)
Dept: LAB | Age: 64
Discharge: HOME | End: 2019-05-30
Payer: MEDICAID

## 2019-05-30 ENCOUNTER — OFFICE VISIT (OUTPATIENT)
Dept: HEMATOLOGY | Age: 64
End: 2019-05-30

## 2019-05-30 VITALS
SYSTOLIC BLOOD PRESSURE: 175 MMHG | OXYGEN SATURATION: 98 % | DIASTOLIC BLOOD PRESSURE: 107 MMHG | HEART RATE: 72 BPM | HEIGHT: 62 IN | TEMPERATURE: 99.6 F | BODY MASS INDEX: 25.4 KG/M2 | WEIGHT: 138 LBS

## 2019-05-30 DIAGNOSIS — C22.1 CHOLANGIOCARCINOMA (HCC): Chronic | ICD-10-CM

## 2019-05-30 DIAGNOSIS — C22.1 CHOLANGIOCARCINOMA (HCC): Primary | Chronic | ICD-10-CM

## 2019-05-30 DIAGNOSIS — R76.8 HCV ANTIBODY POSITIVE: ICD-10-CM

## 2019-05-30 PROBLEM — K76.82 HEPATIC ENCEPHALOPATHY: Status: RESOLVED | Noted: 2017-05-21 | Resolved: 2019-05-30

## 2019-05-30 PROBLEM — K76.6 PORTAL HYPERTENSION (HCC): Status: RESOLVED | Noted: 2017-05-21 | Resolved: 2019-05-30

## 2019-05-30 PROBLEM — K74.60 HEPATIC CIRRHOSIS (HCC): Status: RESOLVED | Noted: 2017-05-21 | Resolved: 2019-05-30

## 2019-05-30 LAB
ALBUMIN SERPL-MCNC: 3.8 G/DL (ref 3.4–5)
ALBUMIN/GLOB SERPL: 0.9 {RATIO} (ref 0.8–1.7)
ALP SERPL-CCNC: 73 U/L (ref 45–117)
ALT SERPL-CCNC: 28 U/L (ref 13–56)
AMMONIA PLAS-SCNC: 24 UMOL/L (ref 11–32)
ANION GAP SERPL CALC-SCNC: 7 MMOL/L (ref 3–18)
AST SERPL-CCNC: 24 U/L (ref 15–37)
BASOPHILS # BLD: 0 K/UL (ref 0–0.1)
BASOPHILS NFR BLD: 0 % (ref 0–2)
BILIRUB DIRECT SERPL-MCNC: 0.1 MG/DL (ref 0–0.2)
BILIRUB SERPL-MCNC: 0.4 MG/DL (ref 0.2–1)
BUN SERPL-MCNC: 18 MG/DL (ref 7–18)
BUN/CREAT SERPL: 15 (ref 12–20)
CALCIUM SERPL-MCNC: 8.9 MG/DL (ref 8.5–10.1)
CHLORIDE SERPL-SCNC: 104 MMOL/L (ref 100–108)
CO2 SERPL-SCNC: 29 MMOL/L (ref 21–32)
CREAT SERPL-MCNC: 1.18 MG/DL (ref 0.6–1.3)
DIFFERENTIAL METHOD BLD: NORMAL
EOSINOPHIL # BLD: 0.1 K/UL (ref 0–0.4)
EOSINOPHIL NFR BLD: 1 % (ref 0–5)
ERYTHROCYTE [DISTWIDTH] IN BLOOD BY AUTOMATED COUNT: 13.4 % (ref 11.6–14.5)
GLOBULIN SER CALC-MCNC: 4.1 G/DL (ref 2–4)
GLUCOSE SERPL-MCNC: 101 MG/DL (ref 74–99)
HCT VFR BLD AUTO: 42.1 % (ref 35–45)
HGB BLD-MCNC: 13.7 G/DL (ref 12–16)
INR PPP: 0.9 (ref 0.8–1.2)
LYMPHOCYTES # BLD: 2.5 K/UL (ref 0.9–3.6)
LYMPHOCYTES NFR BLD: 32 % (ref 21–52)
MCH RBC QN AUTO: 30.6 PG (ref 24–34)
MCHC RBC AUTO-ENTMCNC: 32.5 G/DL (ref 31–37)
MCV RBC AUTO: 94 FL (ref 74–97)
MONOCYTES # BLD: 0.6 K/UL (ref 0.05–1.2)
MONOCYTES NFR BLD: 7 % (ref 3–10)
NEUTS SEG # BLD: 4.7 K/UL (ref 1.8–8)
NEUTS SEG NFR BLD: 60 % (ref 40–73)
PLATELET # BLD AUTO: 230 K/UL (ref 135–420)
PMV BLD AUTO: 10.4 FL (ref 9.2–11.8)
POTASSIUM SERPL-SCNC: 3.4 MMOL/L (ref 3.5–5.5)
PROT SERPL-MCNC: 7.9 G/DL (ref 6.4–8.2)
PROTHROMBIN TIME: 12.1 SEC (ref 11.5–15.2)
RBC # BLD AUTO: 4.48 M/UL (ref 4.2–5.3)
SODIUM SERPL-SCNC: 140 MMOL/L (ref 136–145)
WBC # BLD AUTO: 7.9 K/UL (ref 4.6–13.2)

## 2019-05-30 PROCEDURE — 86301 IMMUNOASSAY TUMOR CA 19-9: CPT

## 2019-05-30 PROCEDURE — 85610 PROTHROMBIN TIME: CPT

## 2019-05-30 PROCEDURE — 80048 BASIC METABOLIC PNL TOTAL CA: CPT

## 2019-05-30 PROCEDURE — 36415 COLL VENOUS BLD VENIPUNCTURE: CPT

## 2019-05-30 PROCEDURE — 85025 COMPLETE CBC W/AUTO DIFF WBC: CPT

## 2019-05-30 PROCEDURE — 82378 CARCINOEMBRYONIC ANTIGEN: CPT

## 2019-05-30 PROCEDURE — 82107 ALPHA-FETOPROTEIN L3: CPT

## 2019-05-30 PROCEDURE — 80076 HEPATIC FUNCTION PANEL: CPT

## 2019-05-30 PROCEDURE — 87521 HEPATITIS C PROBE&RVRS TRNSC: CPT

## 2019-05-30 PROCEDURE — 82140 ASSAY OF AMMONIA: CPT

## 2019-05-30 NOTE — PROGRESS NOTES
[address] [facility], [place], FACP, [place], [place]      FANNY Kamara, [facility]     April Lili Croft, SAGE Walker, SAGE Melgoza, SAGE [facility] 136    at [address], [address] [facility], [facility] 22.    [phone]    FAX: [address] [place] [address], [address]    [phone]    FAX: [phone]         PCP: Patient Care Team:  Katelynn Rainey NP as PCP - General (Nurse Practitioner)  Néstor Wooten MD (Physical Medicine and Rehab)    Problem List  Date Reviewed: 5/30/2019          Codes Class Noted    Hepatic cirrhosis ([facility] ICD-10-CM: K74.60  ICD-9-CM: 571.5  5/21/2017        Hepatic encephalopathy ([facility] ICD-10-CM: K72.90  ICD-9-CM: 572.2  5/21/2017        Portal hypertension ([facility] ICD-10-CM: K76.6  ICD-9-CM: 572.3  5/21/2017        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: 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.1  ICD-9-CM: 295.10  12/19/2016        Severe episode of recurrent major depressive disorder, with psychotic features [facility]) ICD-10-CM: F33.3  ICD-9-CM: 296.34  12/19/2016        Liver mass ICD-10-CM: R16.0  ICD-9-CM: 573.9  8/16/2016        Cholangiocarcinoma ([facility] (Chronic) ICD-10-CM: C22.1  ICD-9-CM: 155.1  6/29/2016        HTN (hypertension) ICD-10-CM: I10  ICD-9-CM: 401.9  6/15/2016        Depression ICD-10-CM: F32.9  ICD-9-CM: 311  6/15/2016        HCV (hepatitis C virus) (Chronic) ICD-10-CM: B19.20  ICD-9-CM: 070.70  6/15/2016        Primary malignant neoplasm of liver ([facility] ICD-10-CM: C22.8  ICD-9-CM: 155.0  6/15/2016        Insomnia ICD-10-CM: G47.00  ICD-9-CM: 780.52  5/6/2011        GLENN (generalized anxiety disorder) ICD-10-CM: F41.1  ICD-9-CM: 300.02  10/22/2010        RLS (restless legs syndrome) ICD-10-CM: G25.81  ICD-9-CM: 333.94  10/22/2010        Tobacco abuse ICD-10-CM: Z72.0  ICD-9-CM: 305.1  10/22/2010              Julmunir Gene returns regarding cholangiocarcinoma vs mixed unspecified type liver cancer and its management. HCV has been treated and cured with Zepatier in early 2018. The patient is a 59y.o. year old Black female with history of hepatitis C with evidence of cirrhosis on biopsy 2/2016. She was briefly treated with interferon over 15 years ago but did not tolerate it. Imaging via MRI  Feb 2016 demonstrated 5.2 x 2.8 cm cm right hepatic mass. The lesion was not classic for [address] This was biopsied and determined to be a poorly differentiated cholangiocarcinoma vs. Hep Par 1 negative HCC. There was also cirrhosis noted on this biopsy. In August of 2016 she had the tumor removed. Surgical margins were clear. Gallbladder was also removed. She was lost to follow up post operatively. She then went to see a GI closer to home to be treated for HCV and apparently was denied for uncertain reason. The patient has not developed edema. The patient has not developed hepatic encephalopathy. The patient has not had been evaluated for varices. The most recent laboratory studies indicate that the liver transaminases are normal, alkaline phosphatase is normal, tests of hepatic synthetic and metabolic function are normal, and the platelet count is normal.      The patient has no symptoms which could be attributed to the liver disorder. The patient completes all daily activities without any functional limitations.       The patient has not experienced fatigvers, chills, shortness of breath, chest pain, pain in the right side over the liver, diffuse abdominal pain, nausea, vomiting, constipation, diarrhea, dry eyes, dry mouth, arthralgias, myalgias, yellowing of the eyes or skin, itching, dark urine, problems concentrating, swelling of the abdomen, swelling of the lower extremities, hematemesis, hematochezia. ALLERGIES  Allergies   Allergen Reactions    Lipitor [Atorvastatin] Swelling    Lisinopril Swelling       MEDICATIONS  Current Outpatient Medications   Medication Sig    hydroCHLOROthiazide (HYDRODIURIL) 25 mg tablet Take 1 Tab by mouth daily.  metoprolol tartrate (LOPRESSOR) 50 mg tablet Take 1 Tab by mouth two (2) times a day.  busPIRone (BUSPAR) 10 mg tablet Take 1 Tab by mouth three (3) times daily.  QUEtiapine (SEROQUEL) 25 mg tablet Take 1 Tab by mouth nightly.  ibuprofen (MOTRIN) 800 mg tablet Take 1 Tab by mouth three (3) times daily (with meals). Start after completion of Medrol Dose pack. No current facility-administered medications for this visit. SYSTEM REVIEW NOT RELATED TO LIVER DISEASE OR REVIEWED ABOVE:  Constitution systems: Negative for fever, chills, weight gain, weight loss. Eyes: Negative for visual changes. ENT: Negative for sore throat, painful swallowing. Respiratory: Negative for cough, hemoptysis, SOB. Cardiology: Negative for chest pain, palpitations. GI:  Negative for constipation or diarrhea. : Negative for urinary frequency, dysuria, hematuria, nocturia. Skin: Negative for rash. Hematology: Negative for easy bruising, blood clots. Musculo-skelatal: Negative for back pain, muscle pain, weakness. Neurologic: Negative for headaches, dizziness, vertigo, memory problems not related to HE. Psychology: Negative for anxiety, depression. FAMILY HISTORY:  The father Passed away. No knowledge of medical histry  The mother passed from kidney disease. There is no family history of liver disease. SOCIAL HISTORY:  The patient is . The patient has 5 children. The patient smokes 3-4 cigarettes/day. The patient is retired. She does get social security.     PHYSICAL EXAMINATION:  Visit Vitals  BP (!) 175/107 (BP 1 Location: Right arm, BP Patient Position: Sitting)   Pulse 72   Temp 99.6 °F (37.6 °C)   Ht 5' 2\" (1.575 m)   Wt 138 lb (62.6 kg)   SpO2 98%   BMI 25.24 kg/m²     General: No acute distress. Eyes: Sclera anicteric. ENT: No oral lesions. Thyroid normal.  Nodes: No adenopathy. Skin: No spider angiomata. No jaundice. No palmar erythema. Respiratory: Lungs clear to auscultation. Cardiovascular: Regular heart rate. No murmurs. No JVD. Abdomen: Soft non-tender. Liver size normal to percussion/palpation. Spleen not palpable. No obvious ascites. Extremities: No edema. No muscle wasting. No gross arthritic changes. Neurologic: Alert and oriented. Cranial nerves grossly intact. No asterixis.     LABORATORY STUDIES:  [facility] [place] [facility] [address] & Units 3/25/2019 6/8/2018   WBC 4.6 - 13.2 K/uL  7.2   ANC 1.8 - 8.0 K/UL  4.4   HGB 12.0 - 16.0 g/dL  15.3   HGB 12 - 16 G/DL     HGB (iSTAT) 12 - 16 G/DL      - 420 K/uL  203   INR 0.89 - 1.29  1.00   AST 15 - 37 U/L 25 23   ALT 13 - 56 U/L 22 15   Alk Phos 45 - 117 U/L 78 71   Bili, Total 0.2 - 1.0 MG/DL 0.6 0.6   Bili, Direct 0.0 - 0.3 mg/dL  <0.2   Albumin 3.4 - 5.0 g/dL 3.9 4.2   BUN 7.0 - 18 MG/DL 16 12   BUN (iSTAT) 7 - 18 MG/DL     Creat 0.6 - 1.3 MG/DL 1.04 0.9   Creat (iSTAT) 0.6 - 1.3 MG/DL     Na 136 - 145 mmol/L 143 143   K 3.5 - 5.5 mmol/L 3.5 3.9   Cl 100 - 108 mmol/L 109 (H) 105   CO2 21 - 32 mmol/L 26 28   Glucose 74 - 99 mg/dL 91 88   Ammonia 19 - 65 mcg/dL  49     Cancer Screening Latest Ref Rng & Units 6/8/2018   AFP, Serum 0.0 - 8.0 ng/mL 5.7   AFP-L3% 0.0 - 9.9 % Comment     Cancer Screening Latest Ref Rng & Units 4/25/2017 2/17/2017 6/15/2016   AFP, Serum 0.0 - 8.0 ng/mL 6.0 5.2 571.5 (H)   AFP-L3% 0.0 - 9.9 % 5.6 6.1 86.4 (H)   CA 19-9 0 - 35 U/mL 24 22 24   CEA 0.0 - 4.7 ng/mL   2.9     SEROLOGIES:  Serologies Latest Ref Rng & Units 2/17/2017   Hep A Ab, Total Negative Negative   Hep B Surface Ag Negative Negative   Hep B Core Ab, Total Negative Positive (A)   Hep B Surface AB QL  Reactive   Hep C Genotype  1a   HCV RT-PCR, Quant IU/mL [id number]     LIVER HISTOLOGY:  02/2016 Liver biopsy POORLY DIFFERENTIATED CHOLANGIOCARCINOMA. Positive for cirrhosis. ENDOSCOPIC PROCEDURES:  03/2017. EGD by Dr. Michelle Malone. Esophagus is normal.  pathology is negative. Repeat in 03/2019. RADIOLOGY:  02/2016 MRI. Inferior right hepatic lobe mass with imaging features that are not entirely specific. This is a hypovascular lesion with intrahepatic cholangiocarcinoma and hypovascular metastasis as primary differential possibilities. This lesion was biopsied previously on 1/28/2016.   06/2016. MRI w/ MRCP w/wo contrast.  Stable mass in the inferior right hepatic lobe. Nataliia hepatis and right cardiophrenic lymph nodes are also unchanged. No new findings to suggest progressive/metastatic disease. 02/2017. Abdominal MRI w/wo contrast.  Surgically truncated inferior margin of the right liver lobe which was the site of the tumor. No residual or new enhancing mass lesion. 08/2017. Abdominal MRI w/wo contrast.  Status post partial right hepatectomy. No recurrent or new liver nodules or masses identified. 05/2018. Abdominal MRI w/wo contrast.  No suspicious hepatic lesion for cholangiocarcinoma recurrence. OTHER TESTING:  Not available or performed    Since the last office appointment the patient has:  Had no changes in the liver disease. Has not been seen here since 05/2018. 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 The most recent laboratory studies indicate that the liver transaminases are normal, alkaline phosphatase is normal, tests of hepatic synthetic and metabolic function are   normal, and the platelet count is normal.  Will perform laboratory testing to monitor liver function and degree of liver injury.  This will include hepatic panel, a CBC w/ diff, a BMP, a PT/INR, an AFP-L3%, a CEA, a CA 19-9, an ammonia level, and an HCV RNA.    Complications of cirrhosis were discussed in detail. We discussed thrombocytopenia, portal hypertension, varices, GI bleeding, peripheral edema, ascites, hepatic encephalopathy, and hepatocellular carcinoma. We discussed the need for follow ups on a regular basis, at 3 month intervals to monitor for complications. We discussed the need for every 6 month liver imaging studies. She is s/p hepatic wedge resection of the tumor in 8/2016. Follow up imaging has been performed and no residual or new enhancing mass lesion was found. Repeat MRI Q6 months to screen for HCC/cholanigiocarcinomas. Patient does have cirrhosis mentioned on biopsy though her liver function appears well preserved. The patient had genotype 1A and has previously failed interferon based therapy over 15 years ago. The patient began HCV in August or September, 2017 with once daily Zepatier. She was to be treated for 12 weeks total.  There seems to have been an interruption in her therapy and she may have missed as much as 7 days of treatment. She is a poor historian. I am not sure how adherent she was to the treatment regime, but she is cured of the HCV as evidenced by there being no detectable HCV RNA on labs from 05/2018, much further out than the 12 week SVR filemon. EGD was performed in 03/2017 and her she has a normal esophagus. All of the above issues were discussed with the patient. All questions were answered. The patient expressed a clear understanding of the above. 25 minutes total time spent with this patient with more than 50% of this time spent counseling and coordinating care as described above. [facility] 87 in 3 months.       Quin Bhardwaj NP   [facility] [place] of [address] [place] [address], [address] [place] [address] Lilibeth Lugo, [address] [place] [address]   [phone]

## 2019-05-30 NOTE — PROGRESS NOTES
1. Have you been to the ER, urgent care clinic since your last visit? Hospitalized since your last visit? Yes    2. Have you seen or consulted any other health care providers outside of the [address] [place] [address] since your last visit? Include any pap smears or colon screening.  Yes

## 2019-05-31 LAB
AFP L3 MFR SERPL: 5.8 % (ref 0–9.9)
AFP SERPL-MCNC: 5.2 NG/ML (ref 0–8)
CANCER AG19-9 SERPL-ACNC: 15 U/ML (ref 0–35)
HCV RNA SERPL NAA+PROBE-LOG IU: NOT DETECTED HCV LOG 10IU/ML
HCV RNA SERPL PROBE AMP-ACNC: NOT DETECTED HCVIU/ML
HCV RNA SERPL QL NAA+PROBE: NOT DETECTED

## 2019-06-01 LAB — CEA SERPL-MCNC: 1.7 NG/ML

## 2019-07-30 ENCOUNTER — OFFICE VISIT (OUTPATIENT)
Dept: FAMILY MEDICINE CLINIC | Age: 64
End: 2019-07-30

## 2019-07-30 VITALS
HEIGHT: 62 IN | DIASTOLIC BLOOD PRESSURE: 102 MMHG | BODY MASS INDEX: 26.06 KG/M2 | SYSTOLIC BLOOD PRESSURE: 164 MMHG | RESPIRATION RATE: 16 BRPM | HEART RATE: 62 BPM | WEIGHT: 141.6 LBS | OXYGEN SATURATION: 97 % | TEMPERATURE: 98.6 F

## 2019-07-30 DIAGNOSIS — I10 ESSENTIAL HYPERTENSION: ICD-10-CM

## 2019-07-30 DIAGNOSIS — F51.01 PRIMARY INSOMNIA: ICD-10-CM

## 2019-07-30 DIAGNOSIS — F41.9 ANXIETY AND DEPRESSION: Primary | ICD-10-CM

## 2019-07-30 DIAGNOSIS — F32.A ANXIETY AND DEPRESSION: Primary | ICD-10-CM

## 2019-07-30 DIAGNOSIS — Z91.14 NONCOMPLIANCE WITH MEDICATIONS: ICD-10-CM

## 2019-07-30 RX ORDER — LOSARTAN POTASSIUM AND HYDROCHLOROTHIAZIDE 25; 100 MG/1; MG/1
1 TABLET ORAL DAILY
Qty: 90 TAB | Refills: 1 | Status: SHIPPED | OUTPATIENT
Start: 2019-07-30 | End: 2020-03-24 | Stop reason: SDUPTHER

## 2019-07-30 RX ORDER — METOPROLOL TARTRATE 50 MG/1
100 TABLET ORAL DAILY
Qty: 90 TAB | Refills: 1 | Status: SHIPPED | OUTPATIENT
Start: 2019-07-30 | End: 2019-11-06 | Stop reason: SDUPTHER

## 2019-07-30 RX ORDER — IBUPROFEN 200 MG
1 TABLET ORAL EVERY 24 HOURS
Qty: 30 PATCH | Refills: 0 | Status: SHIPPED | OUTPATIENT
Start: 2019-07-30 | End: 2019-08-29

## 2019-07-30 RX ORDER — BUSPIRONE HYDROCHLORIDE 30 MG/1
10 TABLET ORAL 2 TIMES DAILY
Qty: 60 TAB | Refills: 5 | Status: SHIPPED | OUTPATIENT
Start: 2019-07-30 | End: 2019-11-06 | Stop reason: SDUPTHER

## 2019-07-30 NOTE — PROGRESS NOTES
HISTORY OF PRESENT ILLNESS  Teri Hook is a 59 y.o. female. Patient presents for follow up med check    HPI  Pt is not taking her medications as prescribed. She states the Seroquel made her to drowsy the next day. Pt never picked up her BP meds from the pharmacy    Pt very upset with her  because he does not show her any affection so she is sure this is why her blood pressure is elevated. They have been  12 years. (unclear actual years, the number kept changing). She has relationships with other males to satisfy her needs but this is not working for her. She gets very irritable with him and others due to this situation. Review of Systems   Constitutional: Positive for malaise/fatigue. Negative for chills and fever. Eyes: Negative. Respiratory: Negative. Cardiovascular: Negative. Gastrointestinal: Negative. Psychiatric/Behavioral: Positive for depression. The patient has insomnia. Visit Vitals  BP (!) 164/102 (BP 1 Location: Left arm)   Pulse 62   Temp 98.6 °F (37 °C) (Oral)   Resp 16   Ht 5' 2\" (1.575 m)   Wt 141 lb 9.6 oz (64.2 kg)   SpO2 97%   BMI 25.90 kg/m²     Physical Exam   Constitutional: She is oriented to person, place, and time. She appears well-developed. No distress. Cardiovascular: Normal rate, regular rhythm and normal heart sounds. No murmur heard. Pulmonary/Chest: Effort normal and breath sounds normal. No respiratory distress. She has no wheezes. She has no rales. Neurological: She is alert and oriented to person, place, and time. Psychiatric: Judgment and thought content normal. Her affect is angry. Her speech is tangential. She is hyperactive. Cognition and memory are normal.       ASSESSMENT and PLAN    ICD-10-CM ICD-9-CM    1. Anxiety and depression F41.9 300.00 busPIRone (BUSPAR) 30 mg tablet    F32.9 311    2.  Essential hypertension I10 401.9 losartan-hydroCHLOROthiazide (HYZAAR) 100-25 mg per tablet      metoprolol tartrate (LOPRESSOR) 50 mg tablet   3. Primary insomnia F51.01 307.42    4. Noncompliance with medication. PLAN:  We discussed her situation and patient given list of counselors to call to further discuss this. I have discussed the diagnosis with the patient and the intended plan as seen in the above orders. The patient has received an after-visit summary and questions were answered concerning future plans. I have discussed medication side effects and warnings with the patient as well. Patient will call for further questions. Follow-up and Dispositions    · Return in about 6 weeks (around 9/10/2019) for chronic care.

## 2019-07-30 NOTE — PROGRESS NOTES
Pt is here for follow up HTN, depression  Pt c/o feeling fatiqued \":all my life\"  Pt states she is out of all her medication & never got the metoprolol. Called [facility] & Pt has refills on HCTZ, metoprlol & seroquel. Made made aware. 1. Have you been to the ER, urgent care clinic since your last visit? Hospitalized since your last visit? No    2. Have you seen or consulted any other health care providers outside of the [address] [place] [address] since your last visit? Include any pap smears or colon screening.  No

## 2019-08-30 ENCOUNTER — HOSPITAL ENCOUNTER (OUTPATIENT)
Age: 64
Discharge: HOME | End: 2019-08-30
Payer: MEDICAID

## 2019-08-30 DIAGNOSIS — C22.1 CHOLANGIOCARCINOMA (HCC): Chronic | ICD-10-CM

## 2019-08-30 LAB — CREAT UR-MCNC: 1.1 MG/DL (ref 0.6–1.3)

## 2019-08-30 PROCEDURE — 74183 MRI ABD W/O CNTR FLWD CNTR: CPT

## 2019-08-30 PROCEDURE — 74011250636 HC RX REV CODE- 250/636

## 2019-08-30 PROCEDURE — 82565 ASSAY OF CREATININE: CPT

## 2019-08-30 PROCEDURE — A9577 INJ MULTIHANCE: HCPCS

## 2019-08-30 RX ADMIN — GADOBENATE DIMEGLUMINE 20 ML: 529 INJECTION, SOLUTION INTRAVENOUS at 14:00

## 2019-09-09 ENCOUNTER — TELEPHONE (OUTPATIENT)
Dept: HEMATOLOGY | Age: 64
End: 2019-09-09

## 2019-09-09 NOTE — TELEPHONE ENCOUNTER
----- Message from Bhavesh Bearden sent at 9/3/2019  2:15 PM EDT -----  Regarding: SAGE Marks/Telephone  Pt requesting a call back to reschedule appt to Monday 9/9/19. Best contact [phone].

## 2019-09-11 NOTE — PROGRESS NOTES
Please let her know that her MRI is unremarkable. No liver mass or other no diagnostic evidence of cholangiocarcinoma identified. Follow as scheduled. Thank you.

## 2019-09-30 ENCOUNTER — TELEPHONE (OUTPATIENT)
Dept: FAMILY MEDICINE CLINIC | Age: 64
End: 2019-09-30

## 2019-09-30 NOTE — TELEPHONE ENCOUNTER
Pt called to schedule apt with the ameena but they transferred the call when there was no available appointments same day to schedule for the patient. Patient is complaining of lumbar pain and said that she was seen in the ER and given tylenol but the doctor never did a pap and she needs a pap. Offered appointment on 10-10 with Theron Holiday but patient declined appointment and requesting a call back from the doctor.

## 2019-09-30 NOTE — TELEPHONE ENCOUNTER
Pt request to be scheduled with another provider sooner than Elisa Sepulveda had an opening for a CPE/Pap. Pt scheduled with Henry Coates on October 4, 2019 for CPE/Pap. Pt scheduled with Elisa Sepulveda NP on Oct. 23rd for followup on HTN, chronic care.

## 2019-10-04 ENCOUNTER — OFFICE VISIT (OUTPATIENT)
Dept: FAMILY MEDICINE CLINIC | Age: 64
End: 2019-10-04

## 2019-10-04 ENCOUNTER — HOSPITAL ENCOUNTER (OUTPATIENT)
Dept: GENERAL RADIOLOGY | Age: 64
Discharge: HOME | End: 2019-10-04
Payer: MEDICAID

## 2019-10-04 ENCOUNTER — HOSPITAL ENCOUNTER (OUTPATIENT)
Dept: LAB | Age: 64
Discharge: HOME | End: 2019-10-04
Payer: MEDICAID

## 2019-10-04 VITALS
OXYGEN SATURATION: 98 % | HEART RATE: 75 BPM | RESPIRATION RATE: 17 BRPM | DIASTOLIC BLOOD PRESSURE: 87 MMHG | HEIGHT: 62 IN | SYSTOLIC BLOOD PRESSURE: 178 MMHG | WEIGHT: 145 LBS | BODY MASS INDEX: 26.68 KG/M2 | TEMPERATURE: 98.1 F

## 2019-10-04 DIAGNOSIS — N89.8 VAGINAL DISCHARGE: ICD-10-CM

## 2019-10-04 DIAGNOSIS — M25.562 ACUTE PAIN OF LEFT KNEE: Primary | ICD-10-CM

## 2019-10-04 DIAGNOSIS — Z12.4 ENCOUNTER FOR SCREENING FOR CERVICAL CANCER: ICD-10-CM

## 2019-10-04 DIAGNOSIS — M54.50 ACUTE BILATERAL LOW BACK PAIN WITHOUT SCIATICA: ICD-10-CM

## 2019-10-04 DIAGNOSIS — M25.562 ACUTE PAIN OF LEFT KNEE: ICD-10-CM

## 2019-10-04 PROCEDURE — 87624 HPV HI-RISK TYP POOLED RSLT: CPT

## 2019-10-04 PROCEDURE — 88175 CYTOPATH C/V AUTO FLUID REDO: CPT

## 2019-10-04 PROCEDURE — 87661 TRICHOMONAS VAGINALIS AMPLIF: CPT

## 2019-10-04 PROCEDURE — 87491 CHLMYD TRACH DNA AMP PROBE: CPT

## 2019-10-04 PROCEDURE — 73564 X-RAY EXAM KNEE 4 OR MORE: CPT

## 2019-10-04 PROCEDURE — 73562 X-RAY EXAM OF KNEE 3: CPT

## 2019-10-04 RX ORDER — QUETIAPINE FUMARATE 25 MG/1
TABLET, FILM COATED ORAL
Refills: 1 | COMMUNITY
Start: 2019-07-30 | End: 2019-11-14 | Stop reason: SDUPTHER

## 2019-10-04 RX ORDER — CYCLOBENZAPRINE HCL 5 MG
TABLET ORAL
Refills: 0 | COMMUNITY
Start: 2019-09-18 | End: 2020-08-26

## 2019-10-04 RX ORDER — ACETAMINOPHEN 500 MG
TABLET ORAL
Refills: 0 | COMMUNITY
Start: 2019-09-18 | End: 2020-08-26

## 2019-10-04 NOTE — PROGRESS NOTES
Bailey Cardona presents today for   Chief Complaint   Patient presents with    Other     ed visit back pain    Knee Pain       Is someone accompanying this pt? no    Is the patient using any DME equipment during OV? no    Depression Screening:  3 most recent PHQ Screens 7/30/2019   Little interest or pleasure in doing things Nearly every day   Feeling down, depressed, irritable, or hopeless Nearly every day   Total Score PHQ 2 6   Trouble falling or staying asleep, or sleeping too much Nearly every day   Feeling tired or having little energy Nearly every day   Poor appetite, weight loss, or overeating Nearly every day   Feeling bad about yourself - or that you are a failure or have let yourself or your family down Nearly every day   Trouble concentrating on things such as school, work, reading, or watching TV Not at all   Moving or speaking so slowly that other people could have noticed; or the opposite being so fidgety that others notice Not at all   Thoughts of being better off dead, or hurting yourself in some way Not at all   PHQ 9 Score 18   How difficult have these problems made it for you to do your work, take care of your home and get along with others Somewhat difficult       Learning Assessment:  Learning Assessment 5/7/2018   PRIMARY LEARNER Patient   BARRIERS PRIMARY LEARNER NONE   CO-LEARNER CAREGIVER No   PRIMARY LANGUAGE ENGLISH   LEARNER PREFERENCE PRIMARY LISTENING   ANSWERED BY PATIENT   RELATIONSHIP SELF       Abuse Screening:  No flowsheet data found. Fall Risk  Fall Risk Assessment, last 12 mths 6/15/2016   Able to walk? Yes   Fall in past 12 months? No       Health Maintenance reviewed and discussed and ordered per Provider.     Health Maintenance Due   Topic Date Due    PAP AKA CERVICAL CYTOLOGY  05/18/1976    Shingrix Vaccine Age 50> (1 of 2) 05/18/2005    BREAST CANCER SCRN MAMMOGRAM  05/18/2005    FOBT Q 1 YEAR AGE 50-75  05/18/2005    Influenza Age 9 to Adult  08/01/2019 Coordination of Care:  1. Have you been to the ER, urgent care clinic since your last visit? Hospitalized since your last visit? no    2. Have you seen or consulted any other health care providers outside of the [address] [place] [address] since your last visit? Include any pap smears or colon screening.  no

## 2019-10-04 NOTE — PROGRESS NOTES
HISTORY OF PRESENT ILLNESS  Alexandro Henry is a 59 y.o. female. Patient presents today to follow up on back pain. Reports she went to the ED for same. Denies injury. States she has been taking tylenol and flexeril with mild relief. Denies roseann radiating to lower extremities. Further states she began to have right knee on Wednesday. Admits to mild swelling. Has been wearing brace with minimal relief. Requesting referral to ortho. Patient states she would like a PAP today. Comments she is having white vaginal discharge, denies odor. Comments she is unsure if this may be contributing to her back pain. Allergies   Allergen Reactions    Lipitor [Atorvastatin] Swelling    Lisinopril Swelling     Current Outpatient Medications   Medication Sig Dispense Refill    acetaminophen (TYLENOL) 500 mg tablet take 1 tablet by mouth every 4 hours if needed for pain or fever  0    cyclobenzaprine (FLEXERIL) 5 mg tablet take 1 tablet by mouth three times a day for muscle spasm (DO NOT. ..  (REFER TO PRESCRIPTION NOTES). 0    QUEtiapine (SEROQUEL) 25 mg tablet TAKE 1 TABLET BY MOUTH ONCE DAILY AT NIGHT  1    losartan-hydroCHLOROthiazide (HYZAAR) 100-25 mg per tablet Take 1 Tab by mouth daily. 90 Tab 1    metoprolol tartrate (LOPRESSOR) 50 mg tablet Take 2 Tabs by mouth daily. 90 Tab 1    ibuprofen (MOTRIN) 800 mg tablet Take 1 Tab by mouth three (3) times daily (with meals). Start after completion of Medrol Dose pack. 45 Tab 0    busPIRone (BUSPAR) 30 mg tablet Take 0.333 Tabs by mouth two (2) times a day.  61 Tab 5     Past Medical History:   Diagnosis Date    Cirrhosis of liver ([facility] [address]     Hepatitis C     Hypertension      Social History     Socioeconomic History    Marital status:      Spouse name: Not on file    Number of children: Not on file    Years of education: Not on file    Highest education level: Not on file   Occupational History    Not on file   Social Needs    Financial resource strain: Not on file    Food insecurity:     Worry: Not on file     Inability: Not on file    Transportation needs:     Medical: Not on file     Non-medical: Not on file   Tobacco Use    Smoking status: Current Some Day Smoker     Packs/day: 0.33    Smokeless tobacco: Never Used   Substance and Sexual Activity    Alcohol use: No     Alcohol/week: 0.0 standard drinks    Drug use: Not on file    Sexual activity: Not on file   Lifestyle    Physical activity:     Days per week: Not on file     Minutes per session: Not on file    Stress: Not on file   Relationships    Social connections:     Talks on phone: Not on file     Gets together: Not on file     Attends Jewish service: Not on file     Active member of club or organization: Not on file     Attends meetings of clubs or organizations: Not on file     Relationship status: Not on file    Intimate partner violence:     Fear of current or ex partner: Not on file     Emotionally abused: Not on file     Physically abused: Not on file     Forced sexual activity: Not on file   Other Topics Concern    Not on file   Social History Narrative    Not on file     Wt Readings from Last 3 Encounters:   10/04/19 145 lb (65.8 kg)   07/30/19 141 lb 9.6 oz (64.2 kg)   05/30/19 138 lb (62.6 kg)     BP Readings from Last 3 Encounters:   10/04/19 178/87   07/30/19 (!) 164/102   05/30/19 (!) 175/107     Review of Systems   Constitutional: Negative for chills and fever. Respiratory: Negative for shortness of breath. Cardiovascular: Negative for chest pain and palpitations. Neurological: Negative for dizziness and headaches. /87   Pulse 75   Temp 98.1 °F (36.7 °C) (Oral)   Resp 17   Ht 5' 2\" (1.575 m)   Wt 145 lb (65.8 kg)   SpO2 98%   BMI 26.52 kg/m²      Physical Exam   Constitutional: She is oriented to person, place, and time. She appears well-developed and well-nourished. HENT:   Head: Normocephalic and atraumatic. Neck: Normal range of motion. Neck supple. Cardiovascular: Normal rate, regular rhythm and normal heart sounds. Exam reveals no gallop and no friction rub. No murmur heard. Pulmonary/Chest: Effort normal and breath sounds normal. She has no wheezes. She has no rhonchi. She has no rales. Abdominal: Soft. Bowel sounds are normal. There is no tenderness. Genitourinary:   Genitourinary Comments: Pelvic exam: VULVA: normal appearing vulva with no masses, tenderness or lesions, VAGINA: normal appearing vagina with normal color and discharge, no lesions, CERVIX: normal appearing cervix without discharge or lesions, UTERUS: uterus is normal size, shape, consistency and nontender, ADNEXA: non tender, PAP: Pap smear done today. Musculoskeletal: She exhibits no edema. Left knee: She exhibits swelling (minimal). Tenderness found. Lumbar back: She exhibits tenderness. Neurological: She is alert and oriented to person, place, and time. Skin: Skin is warm and dry. ASSESSMENT and PLAN    ICD-10-CM ICD-9-CM    1. Acute pain of left knee M25.562 719.46 XR KNEE LT 3 V   2. Acute bilateral low back pain without sciatica M54.5 724.2      338.19    3. Encounter for screening for cervical cancer  Z12.4 V76.2 PAP IG, CT-NG-TV, APTIMA HPV AND RFX 12/83,29([id number])   4. Vaginal discharge N89.8 623.5 NUSWAB VAGINITIS + HSV     Orders Placed This Encounter    NUSWAB VAGINITIS + HSV    acetaminophen (TYLENOL) 500 mg tablet    cyclobenzaprine (FLEXERIL) 5 mg tablet    QUEtiapine (SEROQUEL) 25 mg tablet    PAP IG, CT-NG-TV, APTIMA HPV AND RFX 83/70,69([id number])     I have discussed the diagnosis with the patient and the intended plan as seen in the above orders. The patient has received an after-visit summary and questions were answered concerning future plans. I have discussed medication side effects and warnings with the patient as well. Patient agreeable with above plan and verbalizes understanding.     Follow-up and Dispositions    · Return if symptoms worsen or fail to improve.

## 2019-10-04 NOTE — PATIENT INSTRUCTIONS
Learning About Cervical Cancer Screening  What is a cervical cancer screening test?    The cervix is the lower part of the uterus that opens into the vagina. Cervical cancer screening tests check the cells on the cervix for changes that could lead to cancer. Two tests can be used to screen for cervical cancer. They may be used alone or together. A Pap test.   This test looks for changes in the cells of the cervix. Some of these cell changes could lead to cancer. A human papillomavirus (HPV) test.   This test looks for the HPV virus. Some high-risk types of HPV can cause cell changes that could lead to cervical cancer. During either test, the doctor or nurse will insert a tool called a speculum into your vagina. The speculum gently opens the vaginal walls. It allows your doctor to see inside the vagina and the cervix. He or she uses a small swab or brush to collect cell samples from your cervix. Try to schedule the test when you're not having your period. To get ready for the test, your doctor may ask you to use a condom if you have sex before the test. Your doctor may also say to avoid douches, tampons, vaginal medicines, sprays, and powders for at least a day before you have the test.  When should you have a screening test?  Talk with your doctor about cervical cancer screening. If you are a woman with an average risk for cervical cancer, your doctor will likely suggest starting screening at age 24. Women 21 to 34  If you are in this age group, your doctor will likely suggest that you get a Pap test. If your Pap test results are normal, you can wait 3 years to have another test.  Women 27 to 59  Screening options for women in this age group may include:  · A Pap test. If your results are normal, you can wait 3 years to have another test.  · An HPV test. If your results are normal, you can wait 5 years to have another test.  · A Pap test and an HPV test. Having both tests is called co-testing.  If your results are normal, you can wait 5 years to be tested again. Women 72 and older  · If you are age 72 or older, talk with your doctor about what's right for you. Women ages 72 and older may no longer need to be screened for cervical cancer. When to stop having screening tests depends on your medical history, your overall health, and your risk of cervical cell changes or cervical cancer. What happens after the test?  The sample of cells taken during your test will be sent to a lab so that an expert can look at the cells. It usually takes a week or two to get the results back. Pap tests  · A normal result means that the test didn't find any abnormal cells in the sample. · An abnormal result can mean many things. Most of these aren't cancer. The results of your test may be abnormal because:  ? You have an infection of the vagina or cervix, such as a yeast infection. ? You have low estrogen levels after menopause that are causing the cells to change. ? You have cell changes that may be a sign of precancer or cancer. The results are ranked based on how serious the changes might be. HPV tests  · A negative result means that the test didn't find any high-risk HPV in the sample. · A positive HPV test means that at least one type of high-risk HPV was found. It doesn't mean that you have cervical cancer, but it increases your chance of having cell changes that could lead to cancer. Follow-up care is a key part of your treatment and safety. Be sure to make and go to all appointments, and call your doctor if you are having problems. It's also a good idea to know your test results and keep a list of the medicines you take. Where can you learn more? Go to http://nuris-audi.info/. Enter P919 in the search box to learn more about \"Learning About Cervical Cancer Screening. \"  Current as of: December 19, 2018  Content Version: 12.2  © 0679-6662 [employer].  Care instructions adapted under license by [address] (which disclaims liability or warranty for this information). If you have questions about a medical condition or this instruction, always ask your healthcare professional. [address] any warranty or liability for your use of this information.

## 2019-10-07 LAB
C TRACH RRNA SPEC QL NAA+PROBE: NEGATIVE
N GONORRHOEA RRNA SPEC QL NAA+PROBE: NEGATIVE
SPECIMEN SOURCE: NORMAL
T VAGINALIS RRNA SPEC QL NAA+PROBE: NEGATIVE

## 2019-10-09 ENCOUNTER — OFFICE VISIT (OUTPATIENT)
Dept: ORTHOPEDIC SURGERY | Age: 64
End: 2019-10-09

## 2019-10-09 VITALS
HEART RATE: 70 BPM | BODY MASS INDEX: 26.83 KG/M2 | SYSTOLIC BLOOD PRESSURE: 158 MMHG | HEIGHT: 62 IN | RESPIRATION RATE: 18 BRPM | DIASTOLIC BLOOD PRESSURE: 97 MMHG | OXYGEN SATURATION: 96 % | WEIGHT: 145.8 LBS | TEMPERATURE: 97.5 F

## 2019-10-09 DIAGNOSIS — M50.30 DDD (DEGENERATIVE DISC DISEASE), CERVICAL: ICD-10-CM

## 2019-10-09 DIAGNOSIS — M47.812 CERVICAL SPONDYLOSIS WITHOUT MYELOPATHY: Primary | ICD-10-CM

## 2019-10-09 LAB
A VAGINAE DNA VAG QL NAA+PROBE: NORMAL SCORE
BVAB2 DNA VAG QL NAA+PROBE: NORMAL SCORE
C ALBICANS DNA VAG QL NAA+PROBE: NEGATIVE
C GLABRATA DNA VAG QL NAA+PROBE: NEGATIVE
C TRACH RRNA SPEC QL NAA+PROBE: NEGATIVE
HSV1 DNA SPEC QL NAA+PROBE: NEGATIVE
HSV2 DNA SPEC QL NAA+PROBE: NEGATIVE
MEGA1 DNA VAG QL NAA+PROBE: NORMAL SCORE
N GONORRHOEA RRNA SPEC QL NAA+PROBE: NEGATIVE
SPECIMEN SOURCE: NORMAL
T VAGINALIS RRNA SPEC QL NAA+PROBE: NEGATIVE

## 2019-10-09 NOTE — PROGRESS NOTES
[facility]  [address], [place]   Phone: [phone]  Fax: [phone]        PROGRESS NOTE      HISTORY OF PRESENT ILLNESS:  The patient is a 59 y.o. female and was seen today for follow up of neck and bilateral shoulder pain since 1987. Pt denies h/o neck or shoulder surgery or PT. Pt denies neck injections. Had shoulder injections in the past. She self-treats with Ibuprofen without relief. Pt denies dropping things or loss of balance. Pt denies fever, weight loss, or skin changes. Pt denies h/o stomach ulcers or bleeding disorders. The patient is ambidextrous. PmHx schizophrenia (pt denies despite the fact that she is taking Seroquel), Hepatitis C, cirrhosis. Note from Dr. Vladimir Cee dated 2/1/19 indicating patient was seen with c/o bilateral shoulder and neck pain. Referred to us. Cervical spine XR dated 1/31/19 not available for my review. Per report, minimal anterior listhesis C7-T1. Severe multilevel spondylosis, degenerative disc disease and foraminal stenosis. At her last clinical appointment, patient presented with c/o neck and bilateral shoulder pain since 1987. I asked patient to bring in copies of her cervical spine films for my review. I started her on Medrol Dosepak. I gave her a prescription for Ibuprofen following completion of the Medrol Dosepak. The patient returns today with pain location and distribution remain unchanged. She rates her pain 9/10, previously 8/10. Last seen by me 4/16/19, scheduled to f/u in 1 month, failed to f/u. Pt completed MDP with benefit. Pt failed to bring her cervical plain films. Pt continues to deny being schizophrenic, despite being on medication for this. She is not being followed by a psychiatrist. Pt denies change in bowel or bladder habits. ER note from Dr. Alem Aldrich dated 9/18/19 indicating patient presented for bilayeral low back pain x 4 days. Tenderness to palpation bilaterally.  Suspected muscle strain. Treated with Tylenol and Flexeril. Per their note, Lumbar spine plain films indicated lower level facet arthropathy but negatie for fracture or traumatic subluxation of the lumbar spine.  reviewed. Body mass index is 26.67 kg/m².       PCP: Tyson Willams NP      Past Medical History:   Diagnosis Date    Cirrhosis of liver ([facility] [address]     Hepatitis C     Hypertension         Social History     Socioeconomic History    Marital status:      Spouse name: Not on file    Number of children: Not on file    Years of education: Not on file    Highest education level: Not on file   Occupational History    Not on file   Social Needs    Financial resource strain: Not on file    Food insecurity:     Worry: Not on file     Inability: Not on file    Transportation needs:     Medical: Not on file     Non-medical: Not on file   Tobacco Use    Smoking status: Current Some Day Smoker     Packs/day: 0.33    Smokeless tobacco: Never Used   Substance and Sexual Activity    Alcohol use: No     Alcohol/week: 0.0 standard drinks    Drug use: Not on file    Sexual activity: Not on file   Lifestyle    Physical activity:     Days per week: Not on file     Minutes per session: Not on file    Stress: Not on file   Relationships    Social connections:     Talks on phone: Not on file     Gets together: Not on file     Attends Restoration service: Not on file     Active member of club or organization: Not on file     Attends meetings of clubs or organizations: Not on file     Relationship status: Not on file    Intimate partner violence:     Fear of current or ex partner: Not on file     Emotionally abused: Not on file     Physically abused: Not on file     Forced sexual activity: Not on file   Other Topics Concern    Not on file   Social History Narrative    Not on file       Current Outpatient Medications   Medication Sig Dispense Refill    QUEtiapine (SEROQUEL) 25 mg tablet TAKE 1 TABLET BY MOUTH ONCE DAILY AT NIGHT  1    busPIRone (BUSPAR) 30 mg tablet Take 0.333 Tabs by mouth two (2) times a day. 60 Tab 5    losartan-hydroCHLOROthiazide (HYZAAR) 100-25 mg per tablet Take 1 Tab by mouth daily. 90 Tab 1    metoprolol tartrate (LOPRESSOR) 50 mg tablet Take 2 Tabs by mouth daily. 90 Tab 1    acetaminophen (TYLENOL) 500 mg tablet take 1 tablet by mouth every 4 hours if needed for pain or fever  0    cyclobenzaprine (FLEXERIL) 5 mg tablet take 1 tablet by mouth three times a day for muscle spasm (DO NOT. ..  (REFER TO PRESCRIPTION NOTES). 0    ibuprofen (MOTRIN) 800 mg tablet Take 1 Tab by mouth three (3) times daily (with meals). Start after completion of Medrol Dose pack. 45 Tab 0       Allergies   Allergen Reactions    Lipitor [Atorvastatin] Swelling    Lisinopril Swelling          PHYSICAL EXAMINATION    Visit Vitals  BP (!) 158/97 (BP 1 Location: Left arm, BP Patient Position: Sitting)   Pulse 70   Temp 97.5 °F (36.4 °C) (Oral)   Resp 18   Ht 5' 2\" (1.575 m)   Wt 145 lb 12.8 oz (66.1 kg)   SpO2 96%   BMI 26.67 kg/m²       CONSTITUTIONAL: NAD, A&O x 3  SENSATION: Intact to light touch throughout  NEURO: Anjali's is negative bilaterally. RANGE OF MOTION: The patient has full passive range of motion in all four extremities. Shoulder AB/Flex Elbow Flex Wrist Ext Elbow Ext Wrist Flex Hand Intrin Tone   Right +4/5 +4/5 +4/5 +4/5 +4/5 +4/5 +4/5   Left +4/5 +4/5 +4/5 +4/5 +4/5 +4/5 +4/5               RADIOGRAPHS  Preliminary reading of cervical plain films:  Pancervical disc disease. Anterior osteophytes noted throughout th cervical spine. No acute pathology identified. These are being sent out for official reading by Dr. Keila Freeman. ASSESSMENT   Diagnoses and all orders for this visit:    1.  Cervical spondylosis without myelopathy  -     AMB POC XRAY, SPINE, CERVICAL; 2 OR 3  -     REFERRAL TO PAIN MANAGEMENT  -     REFERRAL TO PHYSICAL THERAPY    2. DDD (degenerative disc disease), cervical  -     AMB POC XRAY, SPINE, CERVICAL; 2 OR 3  -     REFERRAL TO PAIN MANAGEMENT  -     REFERRAL TO PHYSICAL THERAPY          IMPRESSION AND PLAN:  Patient requested pain medication. I explained to the patient that we are not a pain management practice and I would not be prescribing her narcotics. I referred her to pain management. I will refer her to physical therapy with an emphasis on HEP. Patient is not interested in surgical intervention or lumbar blocks at this time. Patient is neurologically intact. I will see the patient back in 1 month's time or earlier if needed. Written by Ha Andersen, as dictated by Alonzo Rhodes MD  I examined the patient, reviewed and agree with the note.

## 2019-10-09 NOTE — PROGRESS NOTES
PROGRESS NOTE HISTORY OF PRESENT ILLNESS:  
The patient is a 59 y.o. female and was seen today for follow up of neck and bilateral shoulder pain since 1987. Pt denies h/o neck or shoulder surgery or PT. Pt denies neck injections. Had shoulder injections in the past. She self-treats with Ibuprofen without relief. Pt denies dropping things or loss of balance. Pt denies fever, weight loss, or skin changes. Pt denies h/o stomach ulcers or bleeding disorders. The patient is ambidextrous. PmHx schizophrenia (pt denies despite the fact that she is taking Seroquel), Hepatitis C, cirrhosis. Note from Dr. Jesús Quigley dated 2/1/19 indicating patient was seen with c/o bilateral shoulder and neck pain. Referred to us. Cervical spine XR dated 1/31/19 not available for my review. Per report, minimal anterior listhesis C7-T1. Severe multilevel spondylosis, degenerative disc disease and foraminal stenosis. At her last clinical appointment, patient presented with c/o neck and bilateral shoulder pain since 1987. I asked patient to bring in copies of her cervical spine films for my review. I started her on Medrol Dosepak. I gave her a prescription for Ibuprofen following completion of the Medrol Dosepak. The patient returns today with pain location and distribution remain unchanged. She rates her pain 9/10, previously 8/10. Last seen by me 4/16/19, scheduled to f/u in 1 month, failed to f/u. Pt completed MDP with benefit. Pt failed to bring her cervical plain films. Pt continues to deny being schizophrenic, despite being on medication for this. She is not being followed by a psychiatrist. Pt denies change in bowel or bladder habits. ER note from Dr. Petra Peterson dated 9/18/19 indicating patient presented for bilayeral low back pain x 4 days. Tenderness to palpation bilaterally. Suspected muscle strain. Treated with Tylenol and Flexeril.  Per their note, Lumbar spine plain films indicated lower level facet arthropathy but negatie for fracture or traumatic subluxation of the lumbar spine.  reviewed. Body mass index is 26.67 kg/m². PCP: Susanna Villar NP Past Medical History:  
Diagnosis Date  Cirrhosis of liver ([address]  Hepatitis C   
 Hypertension Social History Current Medications Allergies Allergen Reactions  Lipitor [Atorvastatin] Swelling  Lisinopril Swelling PHYSICAL EXAMINATION Visit Vitals BP (!) 158/97 (BP 1 Location: Left arm, BP Patient Position: Sitting) Pulse 70 Temp 97.5 °F (36.4 °C) (Oral) Resp 18 Ht 5' 2\" (1.575 m) Wt 145 lb 12.8 oz (66.1 kg) SpO2 96% BMI 26.67 kg/m² CONSTITUTIONAL: NAD, A&O x 3 SENSATION: Intact to light touch throughout NEURO: Anjali's is negative bilaterally. RANGE OF MOTION: The patient has full passive range of motion in all four extremities. Shoulder AB/Flex Elbow Flex Wrist Ext Elbow Ext Wrist Flex Hand Intrin Tone Right +4/5 +4/5 +4/5 +4/5 +4/5 +4/5 +4/5 Left +4/5 +4/5 +4/5 +4/5 +4/5 +4/5 +4/5 RADIOGRAPHS Preliminary reading of cervical plain films: 
Pancervical disc disease. Anterior osteophytes noted throughout th cervical spine. No acute pathology identified. These are being sent out for official reading by Dr. Bernabe Hernandez. ASSESSMENT Diagnoses and all orders for this visit: 1. Cervical spondylosis without myelopathy - AMB POC XRAY, SPINE, CERVICAL; 2 OR 3  
- REFERRAL TO PAIN MANAGEMENT  
- REFERRAL TO PHYSICAL THERAPY 2. DDD (degenerative disc disease), cervical  
 - AMB POC XRAY, SPINE, CERVICAL; 2 OR 3  
- REFERRAL TO PAIN MANAGEMENT  
- REFERRAL TO PHYSICAL THERAPY IMPRESSION AND PLAN:  
Patient requested pain medication. I explained to the patient that we are not a pain management practice and I would not be prescribing her narcotics. I referred her to pain management. I will refer her to physical therapy with an emphasis on HEP. Patient is not interested in surgical intervention or lumbar blocks at this time. Patient is neurologically intact. I will see the patient back in 1 month's time or earlier if needed. Written by Karen Bocanegra, as dictated by Moose Marin MD  
I examined the patient, reviewed and agree with the note.

## 2019-10-09 NOTE — LETTER
10/9/19 Patient: Juan Ramon Enter YOB: 1955 Date of Visit: 10/9/2019 Rainer Delgadillo NP 
[address] [facility] [address] [facility] [address] VIA In Basket Dear Rainer Delgadillo NP, Thank you for referring Ms. Neva Crockett to [address] [place] [address] Drive for evaluation. My notes for this consultation are attached. If you have questions, please do not hesitate to call me. I look forward to following your patient along with you. Sincerely, Gerry Mares MD

## 2019-10-09 NOTE — PROGRESS NOTES
[address] [facility] 
[address] [facility] [address], [address]  Phone: [phone] Fax: [phone] PROGRESS NOTE HISTORY OF PRESENT ILLNESS: 
The patient is a 59 y.o. female and was seen today for follow up of neck and bilateral shoulder pain since 1987. Pt denies h/o neck or shoulder surgery or PT. Pt denies neck injections. Had shoulder injections in the past. She self-treats with Ibuprofen without relief. Pt denies dropping things or loss of balance. Pt denies fever, weight loss, or skin changes. Pt denies h/o stomach ulcers or bleeding disorders. The patient is ambidextrous. PmHx schizophrenia (pt denies despite the fact that she is taking Seroquel), Hepatitis C, cirrhosis. Note from Dr. Sue Ontiveros dated 2/1/19 indicating patient was seen with c/o bilateral shoulder and neck pain. Referred to us. Cervical spine XR dated 1/31/19 not available for my review. Per report, minimal anterior listhesis C7-T1. Severe multilevel spondylosis, degenerative disc disease and foraminal stenosis. At her last clinical appointment, patient presented with c/o neck and bilateral shoulder pain since 1987. I asked patient to bring in copies of her cervical spine films for my review. I started her on Medrol Dosepak. I gave her a prescription for Ibuprofen following completion of the Medrol Dosepak. She rates her pain 8/10. The patient returns today ***. she rates her pain ***.  reviewed. There is no height or weight on file to calculate BMI. PCP: Gen Pritchett NP Past Medical History:  
Diagnosis Date  Cirrhosis of liver ([facility] [address]  Hepatitis C   
 Hypertension Social History Socioeconomic History  Marital status:  Spouse name: Not on file  Number of children: Not on file  Years of education: Not on file  Highest education level: Not on file Occupational History  Not on file Social Needs  Financial resource strain: Not on file  Food insecurity:  
  Worry: Not on file Inability: Not on file  Transportation needs:  
  Medical: Not on file Non-medical: Not on file Tobacco Use  Smoking status: Current Some Day Smoker Packs/day: 0.33  Smokeless tobacco: Never Used Substance and Sexual Activity  Alcohol use: No  
  Alcohol/week: 0.0 standard drinks  Drug use: Not on file  Sexual activity: Not on file Lifestyle  Physical activity:  
  Days per week: Not on file Minutes per session: Not on file  Stress: Not on file Relationships  Social connections:  
  Talks on phone: Not on file Gets together: Not on file Attends [employer] service: Not on file Active member of club or organization: Not on file Attends meetings of clubs or organizations: Not on file Relationship status: Not on file  Intimate partner violence:  
  Fear of current or ex partner: Not on file Emotionally abused: Not on file Physically abused: Not on file Forced sexual activity: Not on file Other Topics Concern  Not on file Social History Narrative  Not on file Current Outpatient Medications Medication Sig Dispense Refill  acetaminophen (TYLENOL) 500 mg tablet take 1 tablet by mouth every 4 hours if needed for pain or fever  0  
 cyclobenzaprine (FLEXERIL) 5 mg tablet take 1 tablet by mouth three times a day for muscle spasm (DO NOT. ..  (REFER TO PRESCRIPTION NOTES). 0  
 QUEtiapine (SEROQUEL) 25 mg tablet TAKE 1 TABLET BY MOUTH ONCE DAILY AT NIGHT  1  
 busPIRone (BUSPAR) 30 mg tablet Take 0.333 Tabs by mouth two (2) times a day. 60 Tab 5  
 losartan-hydroCHLOROthiazide (HYZAAR) 100-25 mg per tablet Take 1 Tab by mouth daily. 90 Tab 1  
 metoprolol tartrate (LOPRESSOR) 50 mg tablet Take 2 Tabs by mouth daily. 90 Tab 1  ibuprofen (MOTRIN) 800 mg tablet Take 1 Tab by mouth three (3) times daily (with meals). Start after completion of Medrol Dose pack. 45 Tab 0 Allergies Allergen Reactions  Lipitor [Atorvastatin] Swelling  Lisinopril Swelling PHYSICAL EXAMINATION There were no vitals taken for this visit. CONSTITUTIONAL: NAD, A&O x 3 SENSATION: Intact to light touch throughout NEURO: Anjali's is negative bilaterally. RANGE OF MOTION: The patient has full passive range of motion in all four extremities. MOTOR: 
Straight Leg Raise: {Pos/neg/not test:638466::\"Negative\"}, {right/left:27606} *** Shoulder AB/Flex Elbow Flex Wrist Ext Elbow Ext Wrist Flex Hand Intrin Tone Right +4/5 +4/5 +4/5 +4/5 +4/5 +4/5 +4/5 Left +4/5 +4/5 +4/5 +4/5 +4/5 +4/5 +4/5  
         
*** Hip Flex Knee Ext Knee Flex Ankle DF GTE Ankle PF Tone Right +4/5 +4/5 +4/5 +4/5 +4/5 +4/5 +4/5 Left +4/5 +4/5 +4/5 +4/5 +4/5 +4/5 +4/5 ASSESSMENT  
{There are no diagnoses linked to this encounter. (Refresh or delete this SmartLink)} IMPRESSION AND PLAN: 
*** I will see the patient back in *** month's time or earlier if needed. Written by Ethel Urrutia, as dictated by Dakota Elliott MD 
I examined the patient, reviewed and agree with the note.

## 2019-10-15 ENCOUNTER — TELEPHONE (OUTPATIENT)
Dept: FAMILY MEDICINE CLINIC | Age: 64
End: 2019-10-15

## 2019-10-15 RX ORDER — METRONIDAZOLE 500 MG/1
500 TABLET ORAL 2 TIMES DAILY
Qty: 14 TAB | Refills: 0 | Status: SHIPPED | OUTPATIENT
Start: 2019-10-15 | End: 2019-10-22

## 2019-10-15 NOTE — TELEPHONE ENCOUNTER
Patient called for info on test results from appt on 10/4/19. Also to see if her 2 future appts for the end of October can be combined together, she states not sure what the second one is for.  Please advise pt

## 2019-10-15 NOTE — TELEPHONE ENCOUNTER
Flagyl has been sent to the pharmacy. Also her knee xray reveals she has arthritis. She may take otc ibuprofen or tylenol as directed on bottle for pain. Thanks!

## 2019-10-15 NOTE — TELEPHONE ENCOUNTER
Pt called back wondering can she get something for her back she is still having pain. Please advise [phone]. She also want her results for her pap and her knee xray.

## 2019-10-16 ENCOUNTER — HOSPITAL ENCOUNTER (OUTPATIENT)
Dept: PHYSICAL THERAPY | Age: 64
End: 2019-10-16
Payer: MEDICAID

## 2019-10-16 NOTE — TELEPHONE ENCOUNTER
LM for Pt that flagyl was sent in for BV to pharmacy. Xrays show arthritis & Linda Iyer NP recommends Pt take OTC tylenol or ibuprofen. Pt only needs 1 upcoming appointment with Ladi Mak so please call& cancel one of the appointments.      Pap test was normal.

## 2019-10-21 ENCOUNTER — HOSPITAL ENCOUNTER (OUTPATIENT)
Dept: PHYSICAL THERAPY | Age: 64
Discharge: HOME | End: 2019-10-21
Payer: MEDICAID

## 2019-10-21 PROCEDURE — 97140 MANUAL THERAPY 1/> REGIONS: CPT | Performed by: PHYSICAL THERAPIST

## 2019-10-21 PROCEDURE — 97110 THERAPEUTIC EXERCISES: CPT | Performed by: PHYSICAL THERAPIST

## 2019-10-21 PROCEDURE — 97161 PT EVAL LOW COMPLEX 20 MIN: CPT | Performed by: PHYSICAL THERAPIST

## 2019-10-21 NOTE — PROGRESS NOTES
[facility] - [facility]              [address] [facility]        [place], [address]   [phone] fax    Plan of Care/ Statement of Necessity for Physical Therapy Services  Patient name: Ina Cruz Start of Care: 10/21/2019   Referral source: Joel Vasquez MD : 1955    Medical Diagnosis: Other cervical disc degeneration, unspecified cervical region [M50.30]  Spondylosis without myelopathy or radiculopathy, cervical region [M47.812]  Payor: [facility] MEDICAID / Plan: [address] / Product Type: Managed Care Medicaid /  Onset Date:Chronic since     Treatment Diagnosis: Neck pain   Prior Hospitalization: see medical history Provider#: [id number]   Medications: Verified on Patient summary List    Comorbidities: Anxiety/Panic Attack, Arthritis, Back Pain, Depression, Sleep Dysfunction, Headaches. Prior Level of Function: Independent with pain and at times having to stop doing some of her normal house hold chores. Frequently watches her grandchildren. The Plan of Care and following information is based on the information from the initial evaluation. Assessment/ key information: Patient with signs and symptoms consistent with neck pain. Posture is with elevated left shoulder, moderate forward head posture. AROM C/S flex 58; ext 35; Right SB 15; Left SB 17; Right Rot 51; Left Rot 50 degrees. MMT C4-T1 4/5-5/5.  (+) Cervical Distraction Test; (-) Cervical Compression test.  She has tenderness to palpation over bilateral Upper Trapezius, Levator Scapula. (+) trigger points noted. Patient will benefit from a program of skilled physical therapy to include therapeutic exercises to address strength deficits, therapeutic activities to improve functional mobility, neuromuscular reeducation to address balance, coordination and proprioception, manual therapy to address ROM and tissue extensibility and modalities as indicated.   All questions were answered. Evaluation Complexity History HIGH Complexity :3+ comorbidities / personal factors will impact the outcome/ POC ; Examination MEDIUM Complexity : 3 Standardized tests and measures addressing body structure, function, activity limitation and / or participation in recreation  ;Presentation LOW Complexity : Stable, uncomplicated  ;Clinical Decision Making MEDIUM Complexity : FOTO score of 26-74  Overall Complexity Rating: LOW   Problem List: pain affecting function, decrease ROM, decrease ADL/ functional abilitiies, decrease activity tolerance and decrease flexibility/ joint mobility   Treatment Plan may include any combination of the following: Therapeutic exercise, Therapeutic activities, Neuromuscular re-education, Physical agent/modality and Manual therapy  Patient / Family readiness to learn indicated by: asking questions, trying to perform skills and interest  Persons(s) to be included in education: patient (P)  Barriers to Learning/Limitations: None  Patient Goal (s): I would like the pain to go away so I can do more  Patient Self Reported Health Status: good  Rehabilitation Potential: good    Short Term Goals: To be accomplished in 1 weeks:  1. Patient will become proficient in their HEP and will be compliant in performing that program.  Evaluation:  1. Patient will become proficient in their HEP and will be compliant in performing that program.  Evaluation:   Patient given a written/illustrated HEP. Long Term Goals: To be accomplished in 4 weeks:  1. Patient's pain level will be 4-5/10 with activity in order to improve patient's ability to perform normal ADLs. Evaluation:  8/10  2. Patient will demonstrate cervical extension 40, right side bend 20, left side bend 20, right rotation 60, left rotation 60 AROM to increase ease of ADLs. Evaluation:  AROM C/S flex 58; ext 35; Right SB 15; Left SB 17; Right Rot 51; Left Rot 50 degrees.   3. Patient will increase FOTO score to 53 to indicate increased functional mobility. Evaluation:  51  4. Patient will be able to turn her head with minimal difficulty in order to improve ADLs. Evaluation:  Moderate difficulty. Frequency / Duration: Patient to be seen 3 times per week for 4 weeks. Patient/ Caregiver education and instruction: Diagnosis, prognosis, exercises   [x]  Plan of care has been reviewed with LENORA Kolb, PT 10/21/2019 2:11 PM  ________________________________________________________________________    I certify that the above Therapy Services are being furnished while the patient is under my care. I agree with the treatment plan and certify that this therapy is necessary.     Physician's Signature:____________Date:_________TIME:________    ** Signature, Date and Time must be completed for valid certification **  Please sign and return to [facility] - [address], [facility] [address]  [phone] fax

## 2019-10-21 NOTE — PROGRESS NOTES
PT DAILY TREATMENT NOTE 3-16    Patient Name: Lyndsey Tucker  Date:10/21/2019  : 1955  [x]  Patient  Verified  Payor: [facility] MEDICAID / Plan: [address] / Product Type: Managed Care Medicaid /    In time:2:30  Out time:3:25  Total Treatment Time (min): 55  Visit #: 1 of 12    Treatment Area: Other cervical disc degeneration, unspecified cervical region [M50.30]  Spondylosis without myelopathy or radiculopathy, cervical region [M47.812]    SUBJECTIVE  Pain Level (0-10 scale): 8/10  Any medication changes, allergies to medications, adverse drug reactions, diagnosis change, or new procedure performed?: [x] No    [] Yes (see summary sheet for update)  Subjective functional status/changes:   [] No changes reported  See Evaluation    OBJECTIVE    15 min []Eval                  []Re-Eval     30 min Therapeutic Exercise:  [] See flow sheet :   Rationale: increase ROM and increase strength to improve the patients ability to increase her functional activity level. 10 min Manual Therapy:  Manual traction; manual stretching UT, LS; TPR bilateral UT. Rationale: decrease pain, increase ROM, increase tissue extensibility and decrease trigger points to increase ease of motion to improve function. With   [] TE   [] TA   [] neuro   [] other: Patient Education: [x] Review HEP    [] Progressed/Changed HEP based on:   [] positioning   [] body mechanics   [] transfers   [] heat/ice application    [] other:      Other Objective/Functional Measures: See Evaluation. Pain Level (0-10 scale) post treatment: 8/10    ASSESSMENT/Changes in Function: Patient with signs and symptoms consistent with neck pain. Posture is with elevated left shoulder, moderate forward head posture. AROM C/S flex 58; ext 35; Right SB 15; Left SB 17; Right Rot 51; Left Rot 50 degrees.   MMT C4-T1 4/5-5/5.  (+) Cervical Distraction Test; (-) Cervical Compression test.  She has tenderness to palpation over bilateral Upper Trapezius, Levator Scapula. (+) trigger points noted. Patient will continue to benefit from skilled PT services to modify and progress therapeutic interventions, address functional mobility deficits, address ROM deficits, analyze and address soft tissue restrictions, analyze and cue movement patterns, analyze and modify body mechanics/ergonomics and assess and modify postural abnormalities to attain remaining goals. [x]  See Plan of Care  []  See progress note/recertification  []  See Discharge Summary         Progress towards goals / Updated goals:  Short Term Goals: To be accomplished in 1 weeks:  1. Patient will become proficient in their HEP and will be compliant in performing that program.  Evaluation:  1. Patient will become proficient in their HEP and will be compliant in performing that program.  Evaluation:   Patient given a written/illustrated HEP.     Long Term Goals: To be accomplished in 4 weeks:  1. Patient's pain level will be 4-5/10 with activity in order to improve patient's ability to perform normal ADLs. Evaluation:  8/10  2. Patient will demonstrate cervical extension 40, right side bend 20, left side bend 20, right rotation 60, left rotation 60 AROM to increase ease of ADLs. Evaluation:  AROM C/S flex 58; ext 35; Right SB 15; Left SB 17; Right Rot 51; Left Rot 50 degrees. 3. Patient will increase FOTO score to 53 to indicate increased functional mobility. Evaluation:  51  4. Patient will be able to turn her head with minimal difficulty in order to improve ADLs. Evaluation:  Moderate difficulty.     PLAN  [x]  Upgrade activities as tolerated     [x]  Continue plan of care  []  Update interventions per flow sheet       []  Discharge due to:_  []  Other:_      Mahin Li PT 10/21/2019  2:19 PM    Future Appointments   Date Time Provider Grisel Fitzgerald   10/21/2019  2:30 PM Euna Sessions, PT [facility]   10/23/2019  9:00 AM Graham Torres NP [address] [facility]   10/30/2019  3:50 PM Kenny Ramirez MD [facility] [address]   11/6/2019 11:20 AM Kaila Hendrix MD [facility]

## 2019-10-25 ENCOUNTER — APPOINTMENT (OUTPATIENT)
Dept: PHYSICAL THERAPY | Age: 64
End: 2019-10-25
Payer: MEDICAID

## 2019-10-30 ENCOUNTER — OFFICE VISIT (OUTPATIENT)
Dept: ORTHOPEDIC SURGERY | Age: 64
End: 2019-10-30

## 2019-10-30 ENCOUNTER — HOSPITAL ENCOUNTER (OUTPATIENT)
Dept: PHYSICAL THERAPY | Age: 64
Discharge: HOME | End: 2019-10-30
Payer: MEDICAID

## 2019-10-30 VITALS
TEMPERATURE: 96.3 F | SYSTOLIC BLOOD PRESSURE: 152 MMHG | BODY MASS INDEX: 27.02 KG/M2 | RESPIRATION RATE: 16 BRPM | OXYGEN SATURATION: 37 % | HEART RATE: 113 BPM | HEIGHT: 62 IN | WEIGHT: 146.8 LBS | DIASTOLIC BLOOD PRESSURE: 83 MMHG

## 2019-10-30 DIAGNOSIS — M17.12 PRIMARY OSTEOARTHRITIS OF LEFT KNEE: Primary | ICD-10-CM

## 2019-10-30 PROCEDURE — 97140 MANUAL THERAPY 1/> REGIONS: CPT

## 2019-10-30 PROCEDURE — 97110 THERAPEUTIC EXERCISES: CPT

## 2019-10-30 RX ORDER — DICLOFENAC SODIUM 10 MG/G
4 GEL TOPICAL 4 TIMES DAILY
Qty: 500 G | Refills: 2 | Status: CANCELLED | OUTPATIENT
Start: 2019-10-30

## 2019-10-30 NOTE — PROGRESS NOTES
PT DAILY TREATMENT NOTE 10-18    Patient Name: Ina Cruz  Date:10/30/2019  : 1955  [x]  Patient  Verified  Payor: [facility] MEDICAID / Plan: [place] 46 / Product Type: Managed Care Medicaid /    In time:1107  Out time:1203  Total Treatment Time (min): 56  Visit #: 2 of 12    Medicare/BCBS Only   Total Timed Codes (min):   1:1 Treatment Time:         Treatment Area: Other cervical disc degeneration, unspecified cervical region [M50.30]  Spondylosis without myelopathy or radiculopathy, cervical region [M47.812]    SUBJECTIVE  Pain Level (0-10 scale): 8  Any medication changes, allergies to medications, adverse drug reactions, diagnosis change, or new procedure performed?: [x] No    [] Yes (see summary sheet for update)  Subjective functional status/changes:   [] No changes reported  All I feel is stiffness and pain throughout my neck and shoulders.     OBJECTIVE    Modality rationale: decrease pain to improve the patients ability to tolerate post exercise soreness   Min Type Additional Details    [] Estim:  []Unatt       []IFC  []Premod                        []Other:  []w/ice   []w/heat  Position:  Location:    [] Estim: []Att    []TENS instruct  []NMES                    []Other:  []w/US   []w/ice   []w/heat  Position:  Location:    []  Traction: [] Cervical       []Lumbar                       [] Prone          []Supine                       []Intermittent   []Continuous Lbs:  [] before manual  [] after manual    []  Ultrasound: []Continuous   [] Pulsed                           []1MHz   []3MHz W/cm2:  Location:    []  Iontophoresis with dexamethasone         Location: [] Take home patch   [] In clinic   10 []  Ice     [x]  heat  []  Ice massage  []  Laser   []  Anodyne Position:long-sitting  Location:CS    []  Laser with stim  []  Other:  Position:  Location:    []  Vasopneumatic Device Pressure:       [] lo [] med [] hi   Temperature: [] lo [] med [] hi   [] Skin assessment post-treatment:  []intact []redness- no adverse reaction        38 min Therapeutic Exercise:  [] See flow sheet :   Rationale: increase ROM and increase strength to improve the patients ability to perform daily activities      8 min Manual Therapy:  SOR; B C/S lateral glides grade 4; TrP release in B C/S paraspinals and SCMs   Rationale: decrease pain, increase ROM, increase tissue extensibility and decrease trigger points to perform ADLs          With   [] TE   [] TA   [] neuro   [] other: Patient Education: [x] Review HEP    [] Progressed/Changed HEP based on:   [] positioning   [] body mechanics   [] transfers   [] heat/ice application    [] other:      Other Objective/Functional Measures: initiated therex per flow sheet     Pain Level (0-10 scale) post treatment: 8    ASSESSMENT/Changes in Function: Expectations are low regarding progress made in therapy. Patient has already missed two appointments, and is known for being noncompliant. Patient is extremely slow and very hesitant and apprehensive with all movement during exercises     Patient will continue to benefit from skilled PT services to modify and progress therapeutic interventions, address functional mobility deficits, address ROM deficits, address strength deficits, analyze and address soft tissue restrictions, analyze and cue movement patterns and assess and modify postural abnormalities to attain remaining goals. []  See Plan of Care  []  See progress note/recertification  []  See Discharge Summary         Progress towards goals / Updated goals:  Short Term Goals: To be accomplished in 1 weeks:  1. Patient will become proficient in their HEP and will be compliant in performing that program.  Evaluation:  1. Patient will become proficient in their HEP and will be compliant in performing that program.  Evaluation:   Patient given a written/illustrated HEP.     Long Term Goals: To be accomplished in 4 weeks:  1.  Patient's pain level will be 4-5/10 with activity in order to improve patient's ability to perform normal ADLs. Evaluation:  8/10  2. Patient will demonstrate cervical extension 40, right side bend 20, left side bend 20, right rotation 60, left rotation 60 AROM to increase ease of ADLs. Evaluation:  AROM C/S flex 58; ext 35; Right SB 15; Left SB 17; Right Rot 51; Left Rot 50 degrees. 3. Patient will increase FOTO score to 53 to indicate increased functional mobility. Evaluation:  51  4. Patient will be able to turn her head with minimal difficulty in order to improve ADLs.   Evaluation:  Moderate difficulty.       PLAN  []  Upgrade activities as tolerated     [x]  Continue plan of care  []  Update interventions per flow sheet       []  Discharge due to:_  []  Other:_      JESS Vicente, CMTPT 10/30/2019  11:07 AM    Future Appointments   Date Time Provider Grisel Lia   10/30/2019  3:50 PM April Rosenbaum MD [facility] 69   11/1/2019 11:00 AM Shannan Loredo PTA [facility]   11/6/2019 11:20 AM Johnna Alba MD [place] [id number] [facility]   11/6/2019  1:20 PM Froilan Baptiste NP [facility]

## 2019-10-30 NOTE — PROGRESS NOTES
1. Have you been to the ER, urgent care clinic since your last visit? Hospitalized since your last visit? No    2. Have you seen or consulted any other health care providers outside of the [address] [place] [address] since your last visit? Include any pap smears or colon screening.  No

## 2019-10-30 NOTE — PROGRESS NOTES
Bekah Siddiqui  1955   Chief Complaint   Patient presents with    Knee Pain     left knee pain        HISTORY OF PRESENT ILLNESS  Bekah Siddiqui is a 59 y.o. female who presents today for evaluation of left knee pain. Pt referred by Dr. Yanna Cruz. She rates her pain 7/10 today. Pain has been present for 3 weeks. Pt denies any injury. Pt states she experienced similar pain last year that subsided on its own. She notes the pain is intermittent. Cortisone injections were discussed with the patient today but they feel the pain is not enough to move forward with a cortisone injection at this time. Patient describes the pain as aching and throbbing that is Intermittent in nature. Symptoms are worse with walking, standing, Activity and is better with  Rest. Associated symptoms include nothing. Since problem started, it: has slightly improved. Pain does not wake patient up at night. Has tried cream for the problem. Has tried following treatments: Injections:NO; Brace:NO;  Therapy:NO; Cane/Crutch:NO       Allergies   Allergen Reactions    Lipitor [Atorvastatin] Swelling    Lisinopril Swelling        Past Medical History:   Diagnosis Date    Cirrhosis of liver ([facility] [address]     Hepatitis C     Hypertension       Social History     Socioeconomic History    Marital status:      Spouse name: Not on file    Number of children: Not on file    Years of education: Not on file    Highest education level: Not on file   Occupational History    Not on file   Social Needs    Financial resource strain: Not on file    Food insecurity:     Worry: Not on file     Inability: Not on file    Transportation needs:     Medical: Not on file     Non-medical: Not on file   Tobacco Use    Smoking status: Current Some Day Smoker     Packs/day: 0.33    Smokeless tobacco: Never Used   Substance and Sexual Activity    Alcohol use: No     Alcohol/week: 0.0 standard drinks    Drug use: Not on file    Sexual activity: Not on file   Lifestyle    Physical activity:     Days per week: Not on file     Minutes per session: Not on file    Stress: Not on file   Relationships    Social connections:     Talks on phone: Not on file     Gets together: Not on file     Attends Mandaeism service: Not on file     Active member of club or organization: Not on file     Attends meetings of clubs or organizations: Not on file     Relationship status: Not on file    Intimate partner violence:     Fear of current or ex partner: Not on file     Emotionally abused: Not on file     Physically abused: Not on file     Forced sexual activity: Not on file   Other Topics Concern    Not on file   Social History Narrative    Not on file      Past Surgical History:   Procedure Laterality Date    COLONOSCOPY N/A 8/12/2016    COLONOSCOPY with polypectomy and biopsy performed by [facility]gs [facility] at [facility]  08/2016    HX OTHER SURGICAL      Liver mass removal      History reviewed. No pertinent family history. Current Outpatient Medications   Medication Sig    busPIRone (BUSPAR) 30 mg tablet Take 0.333 Tabs by mouth two (2) times a day.  losartan-hydroCHLOROthiazide (HYZAAR) 100-25 mg per tablet Take 1 Tab by mouth daily.  metoprolol tartrate (LOPRESSOR) 50 mg tablet Take 2 Tabs by mouth daily.  acetaminophen (TYLENOL) 500 mg tablet take 1 tablet by mouth every 4 hours if needed for pain or fever    cyclobenzaprine (FLEXERIL) 5 mg tablet take 1 tablet by mouth three times a day for muscle spasm (DO NOT. ..  (REFER TO PRESCRIPTION NOTES).  QUEtiapine (SEROQUEL) 25 mg tablet TAKE 1 TABLET BY MOUTH ONCE DAILY AT NIGHT    ibuprofen (MOTRIN) 800 mg tablet Take 1 Tab by mouth three (3) times daily (with meals). Start after completion of Medrol Dose pack. No current facility-administered medications for this visit.         REVIEW OF SYSTEM   Patient denies: Weight loss, Fever/Chills, HA, Visual changes, Fatigue, Chest pain, SOB, Abdominal pain, N/V/D/C, Blood in stool or urine, Edema. Pertinent positive as above in HPI. All others were negative    PHYSICAL EXAM:   Visit Vitals  /83   Pulse (!) 113   Temp 96.3 °F (35.7 °C) (Oral)   Resp 16   Ht 5' 2\" (1.575 m)   Wt 146 lb 12.8 oz (66.6 kg)   SpO2 (!) 37%   BMI 26.85 kg/m²     The patient is a well-developed, well-nourished female   in no acute distress. The patient is alert and oriented times three. The patient is alert and oriented times three. Mood and affect are normal.  LYMPHATIC: lymph nodes are not enlarged and are within normal limits  SKIN: normal in color and non tender to palpation. There are no bruises or abrasions noted. NEUROLOGICAL: Motor sensory exam is within normal limits. Reflexes are equal bilaterally. There is normal sensation to pinprick and light touch  MUSCULOSKELETAL:  Examination Left knee   Skin Intact   Range of motion 0-130   Effusion +   Medial joint line tenderness -   Lateral joint line tenderness -   Tenderness Pes Bursa -   Tenderness insertion MCL -   Tenderness insertion LCL -   Martas -   Patella crepitus +   Patella grind +   Lachman -   Pivot shift -   Anterior drawer -   Posterior drawer -   Varus stress -   Valgus stress -   Neurovascular Intact   Calf Swelling and Tenderness to Palpation -   Urvashi's Test -   Hamstring Cord Tightness -       IMAGING: XR of left knee dated 10/04/19 was reviewed and read by Dr. Makenzie Carrasco: No acute osseous findings. Degenerative changes as above. Perhaps trace joint effusion. IMPRESSION:      ICD-10-CM ICD-9-CM    1. Primary osteoarthritis of left knee M17.12 715.16         PLAN:  1. Pt presents today with left knee pain due to primary OA. Cortisone injections were discussed with the patient today but they feel the pain is not enough to move forward with a cortisone injection at this time. She was given a prescription for V gel today. Risk factors include: htn  2.  No ultrasound exam indicated today  3. No cortisone injection indicated today   4. No Physical/Occupational Therapy indicated today  5. No diagnostic test indicated today:   6. No durable medical equipment indicated today  7. No referral indicated today   8. Yes medications indicated today: V GEL  9. No Narcotic indicated today       RTC prn    Office note will be sent to referring provider. Scribed by Sharon Walker) as dictated by MD EDILMA Sutton, Dr. Reny Barth, confirm that all documentation is accurate.     Reny Barth M.D.   [facility]

## 2019-11-01 ENCOUNTER — APPOINTMENT (OUTPATIENT)
Dept: PHYSICAL THERAPY | Age: 64
End: 2019-11-01

## 2019-11-05 NOTE — PROGRESS NOTES
[facility]              [address]        [place], [address] [place]   [phone] fax    Discharge Summary  Patient name: Mary Jackman Start of Care: 10/21/2019   Referral source: Conine Love MD : 1955   Medical/Treatment Diagnosis: Other cervical disc degeneration, unspecified cervical region [M50.30]  Spondylosis without myelopathy or radiculopathy, cervical region [M47.812]  Payor: [facility] MEDICAID / Plan: [employer] / Product Type: Managed Care Medicaid /  Onset Date:Chronic since      Prior Hospitalization: see medical history Provider#: [id number]   Medications: Verified on Patient Summary List    Comorbidities: Anxiety/Panic Attack, Arthritis, Back Pain, Depression, Sleep Dysfunction, Headaches. Prior Level of Function: Independent with pain and at times having to stop doing some of her normal house hold chores. Frequently watches her grandchildren. Visits from Start of Care: 2    Missed Visits: 3  Reporting Period : 10/21/2019 to 2019    Summary of Care:  1. Patient's pain level will be 4-5/10 with activity in order to improve patient's ability to perform normal ADLs. Evaluation:  8/10  2. Patient will demonstrate cervical extension 40, right side bend 20, left side bend 20, right rotation 60, left rotation 60 AROM to increase ease of ADLs. Evaluation:  AROM C/S flex 58; ext 35; Right SB 15; Left SB 17; Right Rot 51; Left Rot 50 degrees. 3. Patient will increase FOTO score to 53 to indicate increased functional mobility. Evaluation:  51  4. Patient will be able to turn her head with minimal difficulty in order to improve ADLs. Evaluation:  Moderate difficulty. Current status unknown due to noncompliance with plan of care.       ASSESSMENT/RECOMMENDATIONS:  [x]Discontinue therapy: []Patient has reached or is progressing toward set goals      [x]Patient is non-compliant or has abdicated, we have tried to contact the patient but were unable to leave a message. []Due to lack of appreciable progress towards set goals    Jose Juan Perez, PT 11/5/2019 3:24 PM    NOTE TO PHYSICIAN:  Please complete the following and fax to: [facility] at [facility] at [phone]  . Retain this original for your records. If you are unable to process this request in   24 hours, please contact our office.      [] I have read the above report and request that my patient continue therapy with the following changes/special instructions:  [] I have read the above report and request that my patient be discharged from therapy    Physician's Signature:____________Date:_________TIME:________    ** Signature, Date and Time must be completed for valid certification **

## 2019-11-06 ENCOUNTER — OFFICE VISIT (OUTPATIENT)
Dept: FAMILY MEDICINE CLINIC | Age: 64
End: 2019-11-06

## 2019-11-06 ENCOUNTER — HOSPITAL ENCOUNTER (OUTPATIENT)
Dept: LAB | Age: 64
Discharge: HOME | End: 2019-11-06
Payer: MEDICAID

## 2019-11-06 ENCOUNTER — OFFICE VISIT (OUTPATIENT)
Dept: ORTHOPEDIC SURGERY | Age: 64
End: 2019-11-06

## 2019-11-06 VITALS
SYSTOLIC BLOOD PRESSURE: 174 MMHG | RESPIRATION RATE: 16 BRPM | BODY MASS INDEX: 26.72 KG/M2 | DIASTOLIC BLOOD PRESSURE: 108 MMHG | HEART RATE: 87 BPM | OXYGEN SATURATION: 96 % | HEIGHT: 62 IN | WEIGHT: 145.2 LBS | TEMPERATURE: 98 F

## 2019-11-06 VITALS
HEART RATE: 75 BPM | WEIGHT: 144 LBS | HEIGHT: 62 IN | SYSTOLIC BLOOD PRESSURE: 178 MMHG | OXYGEN SATURATION: 98 % | RESPIRATION RATE: 16 BRPM | BODY MASS INDEX: 26.5 KG/M2 | DIASTOLIC BLOOD PRESSURE: 116 MMHG

## 2019-11-06 DIAGNOSIS — F41.9 ANXIETY AND DEPRESSION: ICD-10-CM

## 2019-11-06 DIAGNOSIS — F20.1 DISORGANIZED SCHIZOPHRENIA (HCC): ICD-10-CM

## 2019-11-06 DIAGNOSIS — F32.A ANXIETY AND DEPRESSION: ICD-10-CM

## 2019-11-06 DIAGNOSIS — I10 ESSENTIAL HYPERTENSION: ICD-10-CM

## 2019-11-06 DIAGNOSIS — M50.30 DDD (DEGENERATIVE DISC DISEASE), CERVICAL: ICD-10-CM

## 2019-11-06 DIAGNOSIS — M47.812 CERVICAL SPONDYLOSIS WITHOUT MYELOPATHY: Primary | ICD-10-CM

## 2019-11-06 LAB
ALBUMIN SERPL-MCNC: 3.9 G/DL (ref 3.4–5)
ALBUMIN/GLOB SERPL: 1 {RATIO} (ref 0.8–1.7)
ALP SERPL-CCNC: 70 U/L (ref 45–117)
ALT SERPL-CCNC: 22 U/L (ref 13–56)
ANION GAP SERPL CALC-SCNC: 3 MMOL/L (ref 3–18)
AST SERPL-CCNC: 21 U/L (ref 10–38)
BILIRUB SERPL-MCNC: 0.4 MG/DL (ref 0.2–1)
BUN SERPL-MCNC: 17 MG/DL (ref 7–18)
BUN/CREAT SERPL: 16 (ref 12–20)
CALCIUM SERPL-MCNC: 9.1 MG/DL (ref 8.5–10.1)
CHLORIDE SERPL-SCNC: 108 MMOL/L (ref 100–111)
CO2 SERPL-SCNC: 31 MMOL/L (ref 21–32)
CREAT SERPL-MCNC: 1.04 MG/DL (ref 0.6–1.3)
GLOBULIN SER CALC-MCNC: 3.8 G/DL (ref 2–4)
GLUCOSE SERPL-MCNC: 99 MG/DL (ref 74–99)
POTASSIUM SERPL-SCNC: 3.5 MMOL/L (ref 3.5–5.5)
PROT SERPL-MCNC: 7.7 G/DL (ref 6.4–8.2)
SODIUM SERPL-SCNC: 142 MMOL/L (ref 136–145)
TSH SERPL DL<=0.05 MIU/L-ACNC: 0.52 UIU/ML (ref 0.36–3.74)

## 2019-11-06 PROCEDURE — 36415 COLL VENOUS BLD VENIPUNCTURE: CPT

## 2019-11-06 PROCEDURE — 84443 ASSAY THYROID STIM HORMONE: CPT

## 2019-11-06 PROCEDURE — 80061 LIPID PANEL: CPT

## 2019-11-06 PROCEDURE — 80053 COMPREHEN METABOLIC PANEL: CPT

## 2019-11-06 RX ORDER — CLONIDINE HYDROCHLORIDE 0.1 MG/1
0.1 TABLET ORAL
Qty: 90 TAB | Refills: 1 | Status: SHIPPED | OUTPATIENT
Start: 2019-11-06 | End: 2020-09-30 | Stop reason: SDUPTHER

## 2019-11-06 RX ORDER — METOPROLOL TARTRATE 100 MG/1
100 TABLET ORAL DAILY
Qty: 90 TAB | Refills: 1 | Status: SHIPPED | OUTPATIENT
Start: 2019-11-06 | End: 2020-09-30 | Stop reason: SDUPTHER

## 2019-11-06 RX ORDER — BUSPIRONE HYDROCHLORIDE 30 MG/1
30 TABLET ORAL 2 TIMES DAILY
Qty: 60 TAB | Refills: 5 | Status: SHIPPED | OUTPATIENT
Start: 2019-11-06 | End: 2020-12-02 | Stop reason: SDUPTHER

## 2019-11-06 NOTE — LETTER
11/6/19 Patient: Diana Mac YOB: 1955 Date of Visit: 11/6/2019 Sally Villanueva NP 
[address] [facility] [address] VIA In Basket Dear Sally Villanueva NP, Thank you for referring Ms. Dinh Escobar to [address] for evaluation. My notes for this consultation are attached. If you have questions, please do not hesitate to call me. I look forward to following your patient along with you. Sincerely, Anam Gottlieb MD

## 2019-11-06 NOTE — PROGRESS NOTES
[facility]  [address]  Phone: [phone]  Fax: [phone]        PROGRESS NOTE      HISTORY OF PRESENT ILLNESS:  The patient is a 59 y.o. female and was seen today for follow up of neck and bilateral shoulder pain since 1987. Pt denies h/o neck or shoulder surgery or PT. Pt denies neck injections. Had shoulder injections in the past. Pt completed MDP with benefit. She self-treats with Ibuprofen without relief. Pt denies dropping things or loss of balance. Pt denies fever, weight loss, or skin changes. Pt denies h/o stomach ulcers or bleeding disorders. The patient is ambidextrous. PmHx schizophrenia (pt denies despite the fact that she is taking Seroquel, not followed by psychiatrist), Hepatitis C, cirrhosis. Note from Dr. Vladimir Cee dated 2/1/19 indicating patient was seen with c/o bilateral shoulder and neck pain. Referred to us. ER note from Dr. Alem Aldrich dated 9/18/19 indicating patient presented for bilateral low back pain x 4 days. Tenderness to palpation bilaterally. Suspected muscle strain. Treated with Tylenol and Flexeril. Per their note, Lumbar spine plain films indicated lower level facet arthropathy but negatie for fracture or traumatic subluxation of the lumbar spine. Cervical spine XR dated 1/31/19 not available for my review. Per report, minimal anterior listhesis C7-T1. Severe multilevel spondylosis, degenerative disc disease and foraminal stenosis. Preliminary reading of cervical plain films: Pancervical disc disease. Anterior osteophytes noted throughout the cervical spine. No acute pathology identified. At her last clinical appointment, patient requested pain medication. I explained to the patient that we were not a pain management practice and I would not be prescribing her narcotics. I referred her to pain management. I referred her to physical therapy with an emphasis on HEP.  Patient was not interested in surgical intervention or lumbar blocks at that time. The patient returns today with pain location and distribution remain unchanged. She rates her pain 8/10, previously 9/10. She is currently enrolled in PT. Patient reports she was sweating during PT due to her BP ebeing high. Patient reports hypertensive today (178/116). She reports she is seeing her PCP, Gustavo Duron NP later this afternoon for this. Pt is currently on Flagyl for UTI. Patient has appointment with Dr. Scot Alva in December for pain management. Pt denies dropping things or loss of balance. Therapy notes dated 10/30/19 by Rahul Lynch indicated that their expectations were low for progress since she had already missed 2 appointments and pt was noncompliant with her exercises. Pt was apprehensive with all movements during exercises. Note from Dr. Luci Cruz dated 10/30/19 indicating patient was seen with c/o left knee pain x 3 weeks.  reviewed. Body mass index is 26.34 kg/m².       PCP: Gustavo Duron NP      Past Medical History:   Diagnosis Date    Cir[facility]osis of liver ([facility] [address]     Hepatitis C     Hypertension         Social History     Socioeconomic History    Marital status:      Spouse name: Not on file    Number of children: Not on file    Years of education: Not on file    Highest education level: Not on file   Occupational History    Not on file   Social Needs    Financial resource strain: Not on file    Food insecurity:     Worry: Not on file     Inability: Not on file    Transportation needs:     Medical: Not on file     Non-medical: Not on file   Tobacco Use    Smoking status: Current Some Day Smoker     Packs/day: 0.33    Smokeless tobacco: Never Used   Substance and Sexual Activity    Alcohol use: No     Alcohol/week: 0.0 standard drinks    Drug use: Not on file    Sexual activity: Not on file   Lifestyle    Physical activity:     Days per week: Not on file     Minutes per session: Not on file    Stress: Not on file   Relationships    Social connections:     Talks on phone: Not on file     Gets together: Not on file     Attends Taoist service: Not on file     Active member of club or organization: Not on file     Attends meetings of clubs or organizations: Not on file     Relationship status: Not on file    Intimate partner violence:     Fear of current or ex partner: Not on file     Emotionally abused: Not on file     Physically abused: Not on file     Forced sexual activity: Not on file   Other Topics Concern    Not on file   Social History Narrative    Not on file       Current Outpatient Medications   Medication Sig Dispense Refill    acetaminophen (TYLENOL) 500 mg tablet take 1 tablet by mouth every 4 hours if needed for pain or fever  0    cyclobenzaprine (FLEXERIL) 5 mg tablet take 1 tablet by mouth three times a day for muscle spasm (DO NOT. ..  (REFER TO PRESCRIPTION NOTES). 0    QUEtiapine (SEROQUEL) 25 mg tablet TAKE 1 TABLET BY MOUTH ONCE DAILY AT NIGHT  1    busPIRone (BUSPAR) 30 mg tablet Take 0.333 Tabs by mouth two (2) times a day. 60 Tab 5    losartan-hydroCHLOROthiazide (HYZAAR) 100-25 mg per tablet Take 1 Tab by mouth daily. 90 Tab 1    metoprolol tartrate (LOPRESSOR) 50 mg tablet Take 2 Tabs by mouth daily. 90 Tab 1    ibuprofen (MOTRIN) 800 mg tablet Take 1 Tab by mouth three (3) times daily (with meals). Start after completion of Medrol Dose pack. 45 Tab 0       Allergies   Allergen Reactions    Lipitor [Atorvastatin] Swelling    Lisinopril Swelling          PHYSICAL EXAMINATION    Visit Vitals  BP (!) 178/116   Pulse 75   Resp 16   Ht 5' 2\" (1.575 m)   Wt 144 lb (65.3 kg)   SpO2 98%   BMI 26.34 kg/m²       CONSTITUTIONAL: NAD, A&O x 3  SENSATION: Intact to light touch throughout  NEURO: Anjali's is negative bilaterally. RANGE OF MOTION: The patient has full passive range of motion in all four extremities.      Shoulder AB/Flex Elbow Flex Wrist Ext Elbow Ext Wrist Flex Hand Intrin Tone   Right +4/5 +4/5 +4/5 +4/5 +4/5 +4/5 +4/5   Left +4/5 +4/5 +4/5 +4/5 +4/5 +4/5 +4/5                 ASSESSMENT   Diagnoses and all orders for this visit:    1. Cervical spondylosis without myelopathy    2. DDD (degenerative disc disease), cervical          IMPRESSION AND PLAN:  Patient reports hypertensive today (178/116). She reports she is seeing her PCP, Jessica Zacarias NP later this afternoon for this. I recommend she get her BP under control before proceeding with more aggressive treatment. Patient should complete physical therapy as prescribed and perform her HEP daily following that. Patient is neurologically intact. I will see the patient back in 1 month's time or earlier if needed. Written by Dinora Bocanegra, as dictated by Jewell Mae MD  I examined the patient, reviewed and agree with the note.

## 2019-11-06 NOTE — PROGRESS NOTES
HISTORY OF PRESENT ILLNESS  James Lundy is a 59 y.o. female. Patient presents for concerns regarding HTN    HPI  Pt is not sleeping well. Stressed at home. She states her  does not talk to her or touch her. She is very frustrated with that. Saw ortho earlier today. He advised her that she needs to get her BP under control. Pt states her BP is only up when she is being seen by a provider. Review of Systems   Constitutional: Negative. Respiratory: Negative. Cardiovascular: Negative. Genitourinary: Negative. Musculoskeletal: Positive for joint pain. Psychiatric/Behavioral: The patient is nervous/anxious and has insomnia. Visit Vitals  BP (!) 174/108 (BP 1 Location: Left arm)   Pulse 87   Temp 98 °F (36.7 °C) (Oral)   Resp 16   Ht 5' 2\" (1.575 m)   Wt 145 lb 3.2 oz (65.9 kg)   SpO2 96%   BMI 26.56 kg/m²     Physical Exam   Constitutional: She appears well-developed. No distress. Cardiovascular: Normal rate, regular rhythm and normal heart sounds. No murmur heard. Pulmonary/Chest: Effort normal and breath sounds normal. No respiratory distress. She has no wheezes. She has no rales. Psychiatric: Her mood appears anxious. Her affect is inappropriate. Her speech is rapid and/or pressured. She is agitated and hyperactive. Thought content is paranoid. Cognition and memory are normal. She expresses impulsivity. Diagnoses and all orders for this visit:    Essential hypertension  -     metoprolol tartrate (LOPRESSOR) 100 mg IR tablet; Take 1 Tab by mouth daily. , Normal, Disp-90 Tab, R-1  -     cloNIDine HCl (CATAPRES) 0.1 mg tablet; Take 1 Tab by mouth nightly., Normal, Disp-90 Tab, R-1  -     LIPID PANEL; Future  -     METABOLIC PANEL, COMPREHENSIVE; Future    Anxiety and depression  -     busPIRone (BUSPAR) 30 mg tablet; Take 1 Tab by mouth two (2) times a day., Normal, Disp-60 Tab, R-5  -     TSH 3RD GENERATION;  Future    Disorganized schizophrenia ([facility]      PLAN:  We discussed her BP, the risk of not treating this. I have discussed the diagnosis with the patient and the intended plan as seen in the above orders. The patient has received an after-visit summary and questions were answered concerning future plans. I have discussed medication side effects and warnings with the patient as well. Patient will call for further questions. Follow-up and Dispositions    · Return in about 4 weeks (around 12/4/2019) for blood pressure check.

## 2019-11-06 NOTE — PROGRESS NOTES
Pt is here for follow up on HTN    1. Have you been to the ER, urgent care clinic since your last visit? Hospitalized since your last visit? No    2. Have you seen or consulted any other health care providers outside of the [address] [place] [address] since your last visit? Include any pap smears or colon screening.  No

## 2019-11-07 ENCOUNTER — TELEPHONE (OUTPATIENT)
Dept: ORTHOPEDIC SURGERY | Age: 64
End: 2019-11-07

## 2019-11-07 LAB
CHOLEST SERPL-MCNC: 194 MG/DL
HDLC SERPL-MCNC: 61 MG/DL (ref 40–60)
HDLC SERPL: 3.2 {RATIO} (ref 0–5)
LDLC SERPL CALC-MCNC: 114.2 MG/DL (ref 0–100)
LIPID PROFILE,FLP: ABNORMAL
TRIGL SERPL-MCNC: 94 MG/DL (ref ?–150)
VLDLC SERPL CALC-MCNC: 18.8 MG/DL

## 2019-11-16 RX ORDER — QUETIAPINE FUMARATE 25 MG/1
25 TABLET, FILM COATED ORAL
Qty: 90 TAB | Refills: 1 | Status: SHIPPED | OUTPATIENT
Start: 2019-11-16 | End: 2020-06-12

## 2019-12-04 ENCOUNTER — OFFICE VISIT (OUTPATIENT)
Dept: ORTHOPEDIC SURGERY | Age: 64
End: 2019-12-04

## 2019-12-04 VITALS
OXYGEN SATURATION: 99 % | TEMPERATURE: 97.6 F | DIASTOLIC BLOOD PRESSURE: 95 MMHG | SYSTOLIC BLOOD PRESSURE: 161 MMHG | WEIGHT: 149.8 LBS | HEART RATE: 47 BPM | HEIGHT: 62 IN | RESPIRATION RATE: 16 BRPM | BODY MASS INDEX: 27.57 KG/M2

## 2019-12-04 DIAGNOSIS — M50.30 DDD (DEGENERATIVE DISC DISEASE), CERVICAL: ICD-10-CM

## 2019-12-04 DIAGNOSIS — M47.812 CERVICAL SPONDYLOSIS WITHOUT MYELOPATHY: Primary | ICD-10-CM

## 2019-12-04 NOTE — PROGRESS NOTES
[facility]  [address], [place]   Phone: [phone]  Fax: [phone]        PROGRESS NOTE      HISTORY OF PRESENT ILLNESS:  The patient is a 59 y.o. female and was seen today for follow up of neck and bilateral shoulder pain since 1987. Pt denies h/o neck or shoulder surgery or PT. Pt denies neck injections. Had shoulder injections in the past. Pt completed MDP with benefit. She self-treats with Ibuprofen without relief. Pt denies dropping things or loss of balance. Pt denies fever, weight loss, or skin changes. Pt denies h/o stomach ulcers or bleeding disorders. The patient is ambidextrous. PmHx schizophrenia (pt denies despite the fact that she is taking Seroquel, not followed by psychiatrist), Hepatitis C, cirrhosis. Note from Dr. Rito Berger dated 2/1/19 indicating patient was seen with c/o bilateral shoulder and neck pain. Referred to us. ER note from Dr. Yary Sandhu dated 9/18/19 indicating patient presented for bilateral low back pain x 4 days. Tenderness to palpation bilaterally. Suspected muscle strain. Treated with Tylenol and Flexeril. Per their note, Lumbar spine plain films indicated lower level facet arthropathy but negatie for fracture or traumatic subluxation of the lumbar spine. Therapy notes dated 10/30/19 by Eva Lopez indicated that their expectations were low for progress since she had already missed 2 appointments and pt was noncompliant with her exercises. Pt was apprehensive with all movements during exercises. Note from Dr. Joy Mercado dated 10/30/19 indicating patient was seen with c/o left knee pain x 3 weeks. Cervical spine XR dated 1/31/19 not available for my review. Per report, minimal anterior listhesis C7-T1. Severe multilevel spondylosis, degenerative disc disease and foraminal stenosis. Preliminary reading of cervical plain films: Pancervical disc disease. Anterior osteophytes noted throughout the cervical spine.  No acute pathology identified. At her last clinical appointment, patient reported hypertensive that day (178/116). She reported she was seeing her PCP, Cleveland Shah NP later that afternoon for this. I recommended she get her BP under control before proceeding with more aggressive treatment. Patient should complete physical therapy as prescribed and perform her HEP daily following that. The patient returns today with pain location and distribution remain unchanged. She rates her pain 9/10, previously 8/10. Pt reports hypertensive today (161/95). Reports Cleveland Shah NP just started her on a new neuropathic pain medication. Pt has appointment with Dr. Sam Maldonado on 12/19/19 for pain management. Pt has not yet returned to PT due to uncontrolled BP. Pt denies dropping things or loss of balance. Note from Cleveland Shah NP dated 11/6/19 indicating patient was seen for uncontrolled BP. BP at that time was 174/108. Started her on Catapres.  reviewed. Body mass index is 27.4 kg/m².       PCP: Cleveland Shah NP      Past Medical History:   Diagnosis Date    Cir[facility]osis of liver ([facility]     Hepatitis C     Hypertension         Social History     Socioeconomic History    Marital status:      Spouse name: Not on file    Number of children: Not on file    Years of education: Not on file    Highest education level: Not on file   Occupational History    Not on file   Social Needs    Financial resource strain: Not on file    Food insecurity:     Worry: Not on file     Inability: Not on file    Transportation needs:     Medical: Not on file     Non-medical: Not on file   Tobacco Use    Smoking status: Current Some Day Smoker     Packs/day: 0.33    Smokeless tobacco: Never Used   Substance and Sexual Activity    Alcohol use: No     Alcohol/week: 0.0 standard drinks    Drug use: Not on file    Sexual activity: Not on file   Lifestyle    Physical activity:     Days per week: Not on file     Minutes per session: Not on file    Stress: Not on file   Relationships    Social connections:     Talks on phone: Not on file     Gets together: Not on file     Attends Latter-day service: Not on file     Active member of club or organization: Not on file     Attends meetings of clubs or organizations: Not on file     Relationship status: Not on file    Intimate partner violence:     Fear of current or ex partner: Not on file     Emotionally abused: Not on file     Physically abused: Not on file     Forced sexual activity: Not on file   Other Topics Concern    Not on file   Social History Narrative    Not on file       Current Outpatient Medications   Medication Sig Dispense Refill    QUEtiapine (SEROQUEL) 25 mg tablet Take 1 Tab by mouth nightly. 90 Tab 1    metoprolol tartrate (LOPRESSOR) 100 mg IR tablet Take 1 Tab by mouth daily. 90 Tab 1    cloNIDine HCl (CATAPRES) 0.1 mg tablet Take 1 Tab by mouth nightly. 90 Tab 1    busPIRone (BUSPAR) 30 mg tablet Take 1 Tab by mouth two (2) times a day. 60 Tab 5    acetaminophen (TYLENOL) 500 mg tablet take 1 tablet by mouth every 4 hours if needed for pain or fever  0    cyclobenzaprine (FLEXERIL) 5 mg tablet take 1 tablet by mouth three times a day for muscle spasm (DO NOT. ..  (REFER TO PRESCRIPTION NOTES). 0    losartan-hydroCHLOROthiazide (HYZAAR) 100-25 mg per tablet Take 1 Tab by mouth daily. 90 Tab 1    ibuprofen (MOTRIN) 800 mg tablet Take 1 Tab by mouth three (3) times daily (with meals). Start after completion of Medrol Dose pack.  45 Tab 0       Allergies   Allergen Reactions    Lipitor [Atorvastatin] Swelling    Lisinopril Swelling          PHYSICAL EXAMINATION    Visit Vitals  BP (!) 161/95 (BP 1 Location: Left arm, BP Patient Position: Sitting)   Pulse (!) 47   Temp 97.6 °F (36.4 °C) (Oral)   Resp 16   Ht 5' 2\" (1.575 m)   Wt 149 lb 12.8 oz (67.9 kg)   SpO2 99%   BMI 27.40 kg/m²       CONSTITUTIONAL: NAD, A&O x 3  SENSATION: Intact to light touch throughout  RANGE OF MOTION: The patient has full passive range of motion in all four extremities. Shoulder AB/Flex Elbow Flex Wrist Ext Elbow Ext Wrist Flex Hand Intrin Tone   Right +4/5 +4/5 +4/5 +4/5 +4/5 +4/5 +4/5   Left +4/5 +4/5 +4/5 +4/5 +4/5 +4/5 +4/5                 ASSESSMENT   Diagnoses and all orders for this visit:    1. Cervical spondylosis without myelopathy    2. DDD (degenerative disc disease), cervical          IMPRESSION AND PLAN:  Patient should follow with Dr. Yovany Stanton for pain management as scheduled. I recommend she continue to follow with Autumn Paulino NP to get her BP under control. In the interim, I gave her an Rx for antiinflammatory compound cream. Patient is not interested in surgical intervention or lumbar blocks at this time. Patient is neurologically intact. I will see the patient back in 1 month's time or earlier if needed. Written by Aniceto Cortes, as dictated by Karie Morocho MD  I examined the patient, reviewed and agree with the note.

## 2019-12-04 NOTE — LETTER
12/4/19 Patient: Amanda Vo YOB: 1955 Date of Visit: 12/4/2019 Herrera Doyle NP 
[address] [facility] [address] [place] [address] VIA In Basket Dear Herrera Doyle NP, Thank you for referring Ms. Esthela Bermudez to [address] for evaluation. My notes for this consultation are attached. If you have questions, please do not hesitate to call me. I look forward to following your patient along with you. Sincerely, Nikki Garland MD

## 2019-12-06 ENCOUNTER — TELEPHONE (OUTPATIENT)
Dept: ORTHOPEDIC SURGERY | Age: 64
End: 2019-12-06

## 2019-12-06 RX ORDER — CREAM BASE NO.171
240 CREAM (GRAM) MISCELLANEOUS 4 TIMES DAILY
Qty: 240 G | Refills: 1 | Status: SHIPPED | OUTPATIENT
Start: 2019-12-06 | End: 2020-12-02 | Stop reason: ALTCHOICE

## 2019-12-06 NOTE — TELEPHONE ENCOUNTER
Patient called stating she was supposed to have an antiinflammatory cream sent to her pharmacy but it was never received. She asked if this can be sent to [facility] on [address] in [place]. She also asked if she could get another note stating she is unable to keep her [employer] anymore due to her condition. Please advise patient at [phone] when completed.

## 2020-03-24 ENCOUNTER — TELEPHONE (OUTPATIENT)
Dept: FAMILY MEDICINE CLINIC | Age: 65
End: 2020-03-24

## 2020-03-24 DIAGNOSIS — I10 ESSENTIAL HYPERTENSION: ICD-10-CM

## 2020-03-24 RX ORDER — LOSARTAN POTASSIUM AND HYDROCHLOROTHIAZIDE 25; 100 MG/1; MG/1
1 TABLET ORAL DAILY
Qty: 90 TAB | Refills: 0 | Status: SHIPPED | OUTPATIENT
Start: 2020-03-24 | End: 2020-03-26 | Stop reason: SDUPTHER

## 2020-03-24 NOTE — TELEPHONE ENCOUNTER
Called Pt to triage Pt concerning Pt's appt scheduled per verblaura read back order Gaurav Park NP. Pt has she has been having a cough. Pt aware she will not be allowed to come for her visit tomorrow but if she needs medication refills we can sent them in. Told Pt I had to end call after she continue to accuse me of judging her and she states she does not have the cornoa virus and not to put this on her. Gaurav Park NP was present when the phone call was ended.        Medication refilled per verbral read back order Gaurav Park NP.

## 2020-03-26 ENCOUNTER — TELEPHONE (OUTPATIENT)
Dept: FAMILY MEDICINE CLINIC | Age: 65
End: 2020-03-26

## 2020-03-26 DIAGNOSIS — I10 ESSENTIAL HYPERTENSION: ICD-10-CM

## 2020-03-26 RX ORDER — LOSARTAN POTASSIUM AND HYDROCHLOROTHIAZIDE 25; 100 MG/1; MG/1
1 TABLET ORAL DAILY
Qty: 90 TAB | Refills: 0 | Status: SHIPPED | OUTPATIENT
Start: 2020-03-26 | End: 2020-08-26 | Stop reason: SDUPTHER

## 2020-03-26 NOTE — TELEPHONE ENCOUNTER
Pt called stating her medication went to the wrong pharmacy. She states she never used the [facility] pharmacy could you please send [facility] the [facility] that is in the system.  Took other insurance out

## 2020-06-12 RX ORDER — QUETIAPINE FUMARATE 25 MG/1
TABLET, FILM COATED ORAL
Qty: 90 TAB | Refills: 1 | Status: SHIPPED | OUTPATIENT
Start: 2020-06-12 | End: 2020-09-30 | Stop reason: SDUPTHER

## 2020-08-26 ENCOUNTER — HOSPITAL ENCOUNTER (OUTPATIENT)
Dept: LAB | Age: 65
Discharge: HOME | End: 2020-08-26

## 2020-08-26 ENCOUNTER — OFFICE VISIT (OUTPATIENT)
Dept: HEMATOLOGY | Age: 65
End: 2020-08-26

## 2020-08-26 VITALS
WEIGHT: 132 LBS | DIASTOLIC BLOOD PRESSURE: 117 MMHG | OXYGEN SATURATION: 98 % | TEMPERATURE: 96.2 F | SYSTOLIC BLOOD PRESSURE: 185 MMHG | HEART RATE: 66 BPM | BODY MASS INDEX: 24.14 KG/M2

## 2020-08-26 DIAGNOSIS — K74.60 CIRRHOSIS OF LIVER WITHOUT ASCITES, UNSPECIFIED HEPATIC CIRRHOSIS TYPE (HCC): ICD-10-CM

## 2020-08-26 DIAGNOSIS — K74.60 CIRRHOSIS OF LIVER WITHOUT ASCITES, UNSPECIFIED HEPATIC CIRRHOSIS TYPE (HCC): Primary | ICD-10-CM

## 2020-08-26 DIAGNOSIS — I10 ESSENTIAL HYPERTENSION: ICD-10-CM

## 2020-08-26 LAB — XX-LABCORP SPECIMEN COL,LCBCF: NORMAL

## 2020-08-26 PROCEDURE — 99001 SPECIMEN HANDLING PT-LAB: CPT

## 2020-08-26 RX ORDER — LOSARTAN POTASSIUM AND HYDROCHLOROTHIAZIDE 25; 100 MG/1; MG/1
1 TABLET ORAL DAILY
Qty: 90 TAB | Refills: 0 | Status: SHIPPED | OUTPATIENT
Start: 2020-08-26 | End: 2020-12-21

## 2020-08-26 NOTE — PROGRESS NOTES
[address] [place] [address], Emory COLLIER Ninette Ali, MD Joneen Cheers, PASaraiC    Claudy Sosa, [facility]     Lalitha Callejas, [facility]   Yosef Yan, ISAC-DARIANA Calero, [facility]       Kimberly Deputado [address]    at [address], [address] [place] [address], [facility].    [phone]    FAX: [address] [place] [address]    at [address], [address], [address]    [phone]    FAX: [phone]       PCP: Patient Care Team:  Saadia Morales NP as PCP - General (Nurse Practitioner)  Saadia Morales NP as PCP - [facility]  Omar Flores MD (Physical Medicine and Rehabilitation)    Problem List  Date Reviewed: 12/4/2019          Codes Class Noted    HCV antibody positive ICD-10-CM: R76.8  ICD-9-CM: 795.79  5/30/2019    Overview Signed 5/30/2019  4:21 PM by Elba Turpin NP     HCV treated and cured with Zepatier in Jan., 2018.               Disorganized schizophrenia ([facility] ICD-10-CM: F20.1  ICD-9-CM: 295.10  12/19/2016        Severe episode of recurrent major depressive disorder, with psychotic features ([facility] ICD-10-CM: F33.3  ICD-9-CM: 296.34  12/19/2016        Liver mass ICD-10-CM: R16.0  ICD-9-CM: 573.8  8/16/2016        Cholangiocarcinoma (HCC) (Chronic) ICD-10-CM: C22.1  ICD-9-CM: 155.1  6/29/2016        HTN (hypertension) ICD-10-CM: I10  ICD-9-CM: 401.9  6/15/2016        Depression ICD-10-CM: F32.9  ICD-9-CM: 864  6/15/2016        Primary malignant neoplasm of liver (HCC) ICD-10-CM: C22.8  ICD-9-CM: 155.0  6/15/2016        Insomnia ICD-10-CM: G47.00  ICD-9-CM: 780.52  5/6/2011        GLENN (generalized anxiety disorder) ICD-10-CM: F41.1  ICD-9-CM: 300.02  10/22/2010        RLS (restless legs syndrome) ICD-10-CM: G25.81  ICD-9-CM: 333.94  10/22/2010        Tobacco abuse ICD-10-CM: Z72.0  ICD-9-CM: 305.1  10/22/2010              Raymond Trujillo returns regarding cholangiocarcinoma vs mixed unspecified type liver cancer and its management. HCV has been treated and cured with Zepatier in early 2018. The patient is a 72y.o. year old Black female with history of hepatitis C with evidence of cirrhosis on biopsy 2/2016. She was briefly treated with interferon over 15 years ago but did not tolerate it. Imaging via MRI  Feb 2016 demonstrated 5.2 x 2.8 cm cm right hepatic mass. The lesion was not classic for [address] This was biopsied and determined to be a poorly differentiated cholangiocarcinoma vs. Hep Par 1 negative HCC. There was also cirrhosis noted on this biopsy. In August of 2016 she had the tumor removed. Surgical margins were clear. Gallbladder was also removed. She was lost to follow up post operatively. She then went to see a GI closer to home to be treated for HCV and apparently was denied for uncertain reason. The patient has not developed edema. The patient has not developed hepatic encephalopathy. The patient has not had been evaluated for varices. The most recent laboratory studies indicate that the liver transaminases are normal, alkaline phosphatase is normal, tests of hepatic synthetic and metabolic function are normal, and the platelet count is normal.      The patient has no symptoms which could be attributed to the liver disorder. The patient completes all daily activities without any functional limitations.       The patient has not experienced fatigvers, chills, shortness of breath, chest pain, pain in the right side over the liver, diffuse abdominal pain, nausea, vomiting, constipation, diarrhea, dry eyes, dry mouth, arthralgias, myalgias, yellowing of the eyes or skin, itching, dark urine, problems concentrating, swelling of the abdomen, swelling of the lower extremities, hematemesis, hematochezia. ALLERGIES  Allergies   Allergen Reactions    Lipitor [Atorvastatin] Swelling    Lisinopril Swelling       MEDICATIONS  Current Outpatient Medications   Medication Sig    QUEtiapine (SEROquel) 25 mg tablet take 1 tablet by mouth nightly    losartan-hydroCHLOROthiazide (HYZAAR) 100-25 mg per tablet Take 1 Tab by mouth daily.  COMPOUNDMAX BASE crea 240 g by Does Not Apply route four (4) times daily. Anti-Inflam Diclofenac Sodium 3%Gabapentin 3% Lidocaine 2% Prilocaine 2%    metoprolol tartrate (LOPRESSOR) 100 mg IR tablet Take 1 Tab by mouth daily.  cloNIDine HCl (CATAPRES) 0.1 mg tablet Take 1 Tab by mouth nightly.  busPIRone (BUSPAR) 30 mg tablet Take 1 Tab by mouth two (2) times a day.  acetaminophen (TYLENOL) 500 mg tablet take 1 tablet by mouth every 4 hours if needed for pain or fever    cyclobenzaprine (FLEXERIL) 5 mg tablet take 1 tablet by mouth three times a day for muscle spasm (DO NOT. ..  (REFER TO PRESCRIPTION NOTES).  ibuprofen (MOTRIN) 800 mg tablet Take 1 Tab by mouth three (3) times daily (with meals). Start after completion of Medrol Dose pack. No current facility-administered medications for this visit. SYSTEM REVIEW NOT RELATED TO LIVER DISEASE OR REVIEWED ABOVE:  Constitution systems: Negative for fever, chills, weight gain, weight loss. Eyes: Negative for visual changes. ENT: Negative for sore throat, painful swallowing. Respiratory: Negative for cough, hemoptysis, SOB. Cardiology: Negative for chest pain, palpitations. GI:  Negative for constipation or diarrhea. : Negative for urinary frequency, dysuria, hematuria, nocturia. Skin: Negative for rash. Hematology: Negative for easy bruising, blood clots. Musculo-skelatal: Negative for back pain, muscle pain, weakness. Neurologic: Negative for headaches, dizziness, vertigo, memory problems not related to HE. Psychology: Negative for anxiety, depression.      FAMILY HISTORY:  The father Passed away. No knowledge of medical histry  The mother passed from kidney disease. There is no family history of liver disease. SOCIAL HISTORY:  The patient is . The patient has 5 children. The patient smokes 3-4 cigarettes/day. Trying to quit. Does not drink alcohol. The patient is retired. She does get social security. PHYSICAL EXAMINATION:  Visit Vitals  BP (!) 185/117   Pulse 66   Temp (!) 96.2 °F (35.7 °C) (Tympanic)   Wt 132 lb (59.9 kg)   SpO2 98%   BMI 24.14 kg/m²     General: No acute distress. Eyes: Sclera anicteric. ENT: No oral lesions. Thyroid normal.  Nodes: No adenopathy. Skin: No spider angiomata. No jaundice. No palmar erythema. Respiratory: Lungs clear to auscultation. Cardiovascular: Regular heart rate. No murmurs. No JVD. Abdomen: Soft non-tender. Liver size normal to percussion/palpation. Spleen not palpable. No obvious ascites. Extremities: No edema. No muscle wasting. No gross arthritic changes. Neurologic: Alert and oriented. Cranial nerves grossly intact. No asterixis.     LABORATORY STUDIES:  [facility] [place] of [address] Units 8/26/2020 11/6/2019   WBC 3.4 - 10.8 x10E3/uL 8.5    ANC 1.4 - 7.0 x10E3/uL 5.4    HGB 11.1 - 15.9 g/dL 14.2    HGB 12 - 16 G/DL     HGB (iSTAT) 12 - 16 G/DL      - 450 x10E3/uL 196    INR 0.8 - 1.2 1.0    AST 0 - 40 IU/L 27 21   ALT 0 - 32 IU/L 14 22   Alk Phos 39 - 117 IU/L 73 70   Bili, Total 0.0 - 1.2 mg/dL 0.6 0.4   Bili, Direct 0.00 - 0.40 mg/dL 0.17    Albumin 3.8 - 4.8 g/dL 4.7 3.9   BUN 8 - 27 mg/dL 15 17   BUN (iSTAT) 7 - 18 MG/DL     Creat 0.57 - 1.00 mg/dL 1.11 (H) 1.04   Creat (iSTAT) 0.6 - 1.3 MG/DL     Na 134 - 144 mmol/L 144 142   K 3.5 - 5.2 mmol/L 3.7 3.5   Cl 96 - 106 mmol/L 105 108   CO2 20 - 29 mmol/L 25 31   Glucose 65 - 99 mg/dL 80 99   Ammonia 11 - 32 UMOL/L       Cancer Screening Latest Ref Rng & Units 8/26/2020   AFP, Serum 0.0 - 8.0 ng/mL 5.4 AFP-L3% 0.0 - 9.9 % Comment   CA 19-9 0 - 35 U/mL 14   CEA ng/mL    CEA 0.0 - 4.7 ng/mL 3.3     Cancer Screening Latest Ref Rng & Units 5/30/2019 6/8/2018   AFP, Serum 0.0 - 8.0 ng/mL 5.2  5.7   AFP-L3% 0.0 - 9.9 % 5.8  Comment   CA 19-9 0 - 35 U/mL 15     CEA ng/mL 1.7     CEA 0.0 - 4.7 ng/mL        SEROLOGIES:  Serologies Latest Ref Rng & Units 2/17/2017   Hep A Ab, Total Negative Negative   Hep B Surface Ag Negative Negative   Hep B Core Ab, Total Negative Positive (A)   Hep B Surface AB QL  Reactive   Hep C Genotype  1a   HCV RT-PCR, Quant IU/mL [id number]     LIVER HISTOLOGY:  02/2016 Liver biopsy POORLY DIFFERENTIATED CHOLANGIOCARCINOMA. Positive for cirrhosis. ENDOSCOPIC PROCEDURES:  03/2017. EGD by Dr. Zehra Kong. Esophagus is normal.  Pathology is negative. Repeat in 03/2019. RADIOLOGY:  02/2016 MRI. Inferior right hepatic lobe mass with imaging features that are not entirely specific. This is a hypovascular lesion with intrahepatic cholangiocarcinoma and hypovascular metastasis as primary differential possibilities. This lesion was biopsied previously on 1/28/2016.   06/2016. MRI w/ MRCP w/wo contrast.  Stable mass in the inferior right hepatic lobe. Nataliia hepatis and right cardiophrenic lymph nodes are also unchanged. No new findings to suggest progressive/metastatic disease. 02/2017. Abdominal MRI w/wo contrast.  Surgically truncated inferior margin of the right liver lobe which was the site of the tumor. No residual or new enhancing mass lesion. 08/2017. Abdominal MRI w/wo contrast.  Status post partial right hepatectomy. No recurrent or new liver nodules or masses identified. 05/2018. Abdominal MRI w/wo contrast.  No suspicious hepatic lesion for cholangiocarcinoma recurrence. 08/2019. Abdominal MRI w/ wo contrast. Stable exam. No liver mass or other no diagnostic evidence of  cholangiocarcinoma identified.      OTHER TESTING:  Not available or performed    Since the last office appointment the patient has:  Had no changes in the liver disease. Has not been seen here since 05/2019. Has lost 13 pounds. 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 The most recent laboratory studies indicate that the liver transaminases are normal, alkaline phosphatase is normal, tests of hepatic synthetic and metabolic function are normal, and the platelet count is normal.  Will perform laboratory testing to monitor liver function and degree of liver injury. This will include hepatic panel, a CBC w/ diff, a BMP, a PT/INR, an AFP-L3%, a CEA, a CA 19-9, an ammonia level, and an HCV RNA. Complications of cirrhosis were discussed in detail. We discussed thrombocytopenia, portal hypertension, varices, GI bleeding, peripheral edema, ascites, hepatic encephalopathy, and hepatocellular carcinoma. We discussed the need for follow ups on a regular basis, at 3 month intervals to monitor for complications. We discussed the need for every 6 month liver imaging studies. She is s/p hepatic wedge resection of the tumor in 8/2016. Follow up imaging has been performed and no residual or new enhancing mass lesion was found. Repeat imaging Q6 months to screen for HCC/cholanigiocarcinomas. Ultrasound ordered. Tumor markers ordered. Patient does have cirrhosis mentioned on biopsy though her liver function appears well preserved. The need to perform an assessment of liver fibrosis was discussed with the patient. Fibroscan can assess liver fibrosis and determine if a patient has advanced fibrosis or cirrhosis without the need for liver biopsy. This will be performed at her next appointment here in 3 months. The patient had genotype 1A and has previously failed interferon based therapy over 15 years ago. The patient began HCV in August or September, 2017 with once daily Zepatier.   She was to be treated for 12 weeks total.  There seems to have been an interruption in her therapy and she may have missed as much as 7 days of treatment. She is a poor historian. I am not sure how adherent she was to the treatment regime, but she is cured of the HCV as evidenced by there being no detectable HCV RNA on labs from 05/2018, much further out than the 12 week SVR filemon. EGD was performed in 03/2017 and her she has a normal esophagus. Repeat EGD was ordered during this visit. All of the above issues were discussed with the patient. All questions were answered. The patient expressed a clear understanding of the above. 25 minutes total time spent with this patient with more than 50% of this time spent counseling and coordinating care as described above. [address] [address] in 3 months.       Shani Roque NP   [facility] [place] of [address] [place] [address], [address] [place] [address] Lilibeth Lugo, [facility]   [phone]

## 2020-08-27 LAB
BUN SERPL-MCNC: 15 MG/DL (ref 8–27)
BUN/CREAT SERPL: 14 (ref 12–28)
CALCIUM SERPL-MCNC: 9.8 MG/DL (ref 8.7–10.3)
CHLORIDE SERPL-SCNC: 105 MMOL/L (ref 96–106)
CO2 SERPL-SCNC: 25 MMOL/L (ref 20–29)
CREAT SERPL-MCNC: 1.11 MG/DL (ref 0.57–1)
GLUCOSE SERPL-MCNC: 80 MG/DL (ref 65–99)
POTASSIUM SERPL-SCNC: 3.7 MMOL/L (ref 3.5–5.2)
SODIUM SERPL-SCNC: 144 MMOL/L (ref 134–144)

## 2020-08-28 LAB
AFP L3 MFR SERPL: NORMAL % (ref 0–9.9)
AFP SERPL-MCNC: 5.4 NG/ML (ref 0–8)
ALBUMIN SERPL-MCNC: 4.7 G/DL (ref 3.8–4.8)
ALP SERPL-CCNC: 73 IU/L (ref 39–117)
ALT SERPL-CCNC: 14 IU/L (ref 0–32)
AST SERPL-CCNC: 27 IU/L (ref 0–40)
BASOPHILS # BLD AUTO: 0 X10E3/UL (ref 0–0.2)
BASOPHILS NFR BLD AUTO: 0 %
BILIRUB DIRECT SERPL-MCNC: 0.17 MG/DL (ref 0–0.4)
BILIRUB SERPL-MCNC: 0.6 MG/DL (ref 0–1.2)
CANCER AG19-9 SERPL-ACNC: 14 U/ML (ref 0–35)
CEA SERPL-MCNC: 3.3 NG/ML (ref 0–4.7)
EOSINOPHIL # BLD AUTO: 0.2 X10E3/UL (ref 0–0.4)
EOSINOPHIL NFR BLD AUTO: 3 %
ERYTHROCYTE [DISTWIDTH] IN BLOOD BY AUTOMATED COUNT: 12.7 % (ref 11.7–15.4)
HCT VFR BLD AUTO: 41.6 % (ref 34–46.6)
HCV RNA SERPL QL NAA+PROBE: NEGATIVE
HGB BLD-MCNC: 14.2 G/DL (ref 11.1–15.9)
IMM GRANULOCYTES # BLD AUTO: 0 X10E3/UL (ref 0–0.1)
IMM GRANULOCYTES NFR BLD AUTO: 0 %
INR PPP: 1 (ref 0.8–1.2)
LYMPHOCYTES # BLD AUTO: 2.2 X10E3/UL (ref 0.7–3.1)
LYMPHOCYTES NFR BLD AUTO: 26 %
MCH RBC QN AUTO: 31.1 PG (ref 26.6–33)
MCHC RBC AUTO-ENTMCNC: 34.1 G/DL (ref 31.5–35.7)
MCV RBC AUTO: 91 FL (ref 79–97)
MONOCYTES # BLD AUTO: 0.7 X10E3/UL (ref 0.1–0.9)
MONOCYTES NFR BLD AUTO: 8 %
NEUTROPHILS # BLD AUTO: 5.4 X10E3/UL (ref 1.4–7)
NEUTROPHILS NFR BLD AUTO: 63 %
PLATELET # BLD AUTO: 196 X10E3/UL (ref 150–450)
PROT SERPL-MCNC: 7.5 G/DL (ref 6–8.5)
PROTHROMBIN TIME: 10.6 SEC (ref 9.1–12)
RBC # BLD AUTO: 4.56 X10E6/UL (ref 3.77–5.28)
WBC # BLD AUTO: 8.5 X10E3/UL (ref 3.4–10.8)

## 2020-09-04 ENCOUNTER — HOSPITAL ENCOUNTER (OUTPATIENT)
Dept: ULTRASOUND IMAGING | Age: 65
Discharge: HOME | End: 2020-09-04
Payer: MEDICAID

## 2020-09-04 DIAGNOSIS — K74.60 CIRRHOSIS OF LIVER WITHOUT ASCITES, UNSPECIFIED HEPATIC CIRRHOSIS TYPE (HCC): ICD-10-CM

## 2020-09-04 PROCEDURE — 76705 ECHO EXAM OF ABDOMEN: CPT

## 2020-09-21 ENCOUNTER — VIRTUAL VISIT (OUTPATIENT)
Dept: FAMILY MEDICINE CLINIC | Age: 65
End: 2020-09-21

## 2020-09-22 ENCOUNTER — VIRTUAL VISIT (OUTPATIENT)
Dept: FAMILY MEDICINE CLINIC | Age: 65
End: 2020-09-22

## 2020-09-25 ENCOUNTER — TELEPHONE (OUTPATIENT)
Dept: HEMATOLOGY | Age: 65
End: 2020-09-25

## 2020-09-25 NOTE — TELEPHONE ENCOUNTER
Unable to reach patient to update her on recent imaging. The listed phone number is not working. Follow up as scheduled.

## 2020-09-30 ENCOUNTER — VIRTUAL VISIT (OUTPATIENT)
Dept: FAMILY MEDICINE CLINIC | Age: 65
End: 2020-09-30
Payer: MEDICAID

## 2020-09-30 DIAGNOSIS — Z01.812 PRE-PROCEDURE LAB EXAM: ICD-10-CM

## 2020-09-30 DIAGNOSIS — Z01.812 PRE-PROCEDURE LAB EXAM: Primary | ICD-10-CM

## 2020-09-30 DIAGNOSIS — I10 ESSENTIAL HYPERTENSION: ICD-10-CM
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
## 2020-09-30 DIAGNOSIS — M54.12 CERVICAL RADICULOPATHY: ICD-10-CM

## 2020-09-30 DIAGNOSIS — F51.01 PRIMARY INSOMNIA: Primary | ICD-10-CM

## 2020-09-30 PROCEDURE — 99214 OFFICE O/P EST MOD 30 MIN: CPT | Performed by: NURSE PRACTITIONER

## 2020-09-30 RX ORDER — CLONIDINE HYDROCHLORIDE 0.1 MG/1
0.1 TABLET ORAL
Qty: 90 TAB | Refills: 1 | Status: SHIPPED | OUTPATIENT
Start: 2020-09-30 | End: 2020-12-02 | Stop reason: ALTCHOICE

## 2020-09-30 RX ORDER — LIDOCAINE 50 MG/G
PATCH TOPICAL
COMMUNITY
Start: 2020-09-22 | End: 2020-12-21 | Stop reason: SDUPTHER

## 2020-09-30 RX ORDER — QUETIAPINE FUMARATE 25 MG/1
25 TABLET, FILM COATED ORAL
Qty: 90 TAB | Refills: 1 | Status: SHIPPED | OUTPATIENT
Start: 2020-09-30 | End: 2020-12-02 | Stop reason: ALTCHOICE

## 2020-09-30 RX ORDER — METOPROLOL TARTRATE 100 MG/1
100 TABLET ORAL DAILY
Qty: 90 TAB | Refills: 1 | Status: SHIPPED | OUTPATIENT
Start: 2020-09-30 | End: 2021-05-13 | Stop reason: SDUPTHER

## 2020-09-30 RX ORDER — NAPROXEN 500 MG/1
500 TABLET ORAL 2 TIMES DAILY
COMMUNITY
Start: 2020-09-22 | End: 2020-12-02 | Stop reason: ALTCHOICE

## 2020-09-30 RX ORDER — ORPHENADRINE CITRATE 100 MG/1
100 TABLET, EXTENDED RELEASE ORAL 2 TIMES DAILY
Qty: 30 TAB | Refills: 0 | Status: SHIPPED | OUTPATIENT
Start: 2020-09-30 | End: 2020-12-02 | Stop reason: ALTCHOICE
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
## 2020-10-27 DIAGNOSIS — K74.60 CIRRHOSIS OF LIVER WITHOUT ASCITES, UNSPECIFIED HEPATIC CIRRHOSIS TYPE (HCC): Primary | ICD-10-CM

## 2020-10-27 DIAGNOSIS — K74.60 CIRRHOSIS OF LIVER WITHOUT ASCITES, UNSPECIFIED HEPATIC CIRRHOSIS TYPE (HCC): ICD-10-CM

## 2020-11-11 ENCOUNTER — VIRTUAL VISIT (OUTPATIENT)
Dept: FAMILY MEDICINE CLINIC | Age: 65
End: 2020-11-11

## 2020-11-11 NOTE — PROGRESS NOTES
This encounter was created in error - please disregard. Non-working number Doxy message: \"Something went wrong with message service, please try again later or use another way to invite\".

## 2020-12-01 ENCOUNTER — DOCUMENTATION ONLY (OUTPATIENT)
Dept: HEMATOLOGY | Age: 65
End: 2020-12-01

## 2020-12-01 NOTE — PROGRESS NOTES
LVM for pt to call office to r/s appt due to provider being out sick. Called both numbers that were listed.

## 2020-12-02 ENCOUNTER — VIRTUAL VISIT (OUTPATIENT)
Dept: FAMILY MEDICINE CLINIC | Age: 65
End: 2020-12-02
Payer: MEDICAID

## 2020-12-02 DIAGNOSIS — F32.A ANXIETY AND DEPRESSION: ICD-10-CM

## 2020-12-02 DIAGNOSIS — M54.2 NECK PAIN: ICD-10-CM

## 2020-12-02 DIAGNOSIS — I10 ESSENTIAL HYPERTENSION: ICD-10-CM

## 2020-12-02 DIAGNOSIS — F41.9 ANXIETY AND DEPRESSION: ICD-10-CM

## 2020-12-02 DIAGNOSIS — F41.1 GAD (GENERALIZED ANXIETY DISORDER): Primary | ICD-10-CM

## 2020-12-02 PROCEDURE — 99214 OFFICE O/P EST MOD 30 MIN: CPT | Performed by: FAMILY MEDICINE

## 2020-12-02 RX ORDER — BUSPIRONE HYDROCHLORIDE 30 MG/1
30 TABLET ORAL 2 TIMES DAILY
Qty: 60 TAB | Refills: 5 | Status: SHIPPED | OUTPATIENT
Start: 2020-12-02 | End: 2021-05-13 | Stop reason: SDUPTHER

## 2020-12-02 RX ORDER — CLONIDINE HYDROCHLORIDE 0.1 MG/1
0.1 TABLET ORAL
Qty: 90 TAB | Refills: 1 | Status: SHIPPED | OUTPATIENT
Start: 2020-12-02 | End: 2021-05-13 | Stop reason: SDUPTHER

## 2020-12-02 NOTE — PROGRESS NOTES
Miriam Irving is a 72 y.o. female who was seen by synchronous (real-time) audio-video technology on 12/2/2020 for Neck Pain        Assessment & Plan:   Diagnoses and all orders for this visit:    1. GLENN (generalized anxiety disorder)    2. Anxiety and depression  -     REFERRAL TO PAIN MANAGEMENT  -     busPIRone (BUSPAR) 30 mg tablet; Take 1 Tab by mouth two (2) times a day. 3. Neck pain  -     REFERRAL TO PAIN MANAGEMENT  -     AMB SUPPLY ORDER    4. Essential hypertension  -     cloNIDine HCL (CATAPRES) 0.1 mg tablet; Take 1 Tab by mouth nightly. As above, not controlled   treatment plan as listed below  Orders Placed This Encounter    AMB SUPPLY ORDER    REFERRAL TO PAIN MANAGEMENT    busPIRone (BUSPAR) 30 mg tablet    cloNIDine HCL (CATAPRES) 0.1 mg tablet     Refilled meds needed  Follow-up and Dispositions    · Return in about 4 months (around 4/2/2021) for htn. This has been fully explained to the patient, who indicates understanding. 712  Subjective:     Pt has chronic pain; she has neck pain; she was at the ED recently due to the pain. Chriss Michaud She was placed on a steroid course which she is completing. She is in PT for neck pain;  Does not think this is helpful; she requests a referral to pain management. Pt has a h/o schizophrenia, GLENN and Depression. She does not see a Psychiatrist; She repeatedly states she does not want to see a \" Psychic\" ; I tried to ensure that the pt understood seeing a Psychiatrist vs a  psychic ; but pt repeatedly states she doesn't want to see a \"psychic \" and states she understands the distinction I am trying to make. Pt is on buspar; She needs a refill;     Pt has HTN; she is on meds as listed below; She needs a refill on clonidine. Prior to Admission medications    Medication Sig Start Date End Date Taking? Authorizing Provider   busPIRone (BUSPAR) 30 mg tablet Take 1 Tab by mouth two (2) times a day.  12/2/20  Yes Cristiane Esqueda MD cloNIDine HCL (CATAPRES) 0.1 mg tablet Take 1 Tab by mouth nightly. 12/2/20  Yes Flaca Deras MD   lidocaine (LIDODERM) 5 % apply 1 patch to the affected area daily. Leave on for 12 hours and then off for 12 hours. 9/22/20  Yes Provider, Historical   metoprolol tartrate (LOPRESSOR) 100 mg IR tablet Take 1 Tab by mouth daily. 9/30/20  Yes Brandy Torres NP   losartan-hydroCHLOROthiazide (HYZAAR) 100-25 mg per tablet Take 1 Tab by mouth daily. 8/26/20  Yes Shaquille Brooks NP     Patient Active Problem List    Diagnosis Date Noted    HCV antibody positive 05/30/2019    Disorganized schizophrenia ([facility] 12/19/2016    Severe episode of recurrent major depressive disorder, with psychotic features ([facility] 12/19/2016    Liver mass 08/16/2016    Cholangiocarcinoma ([facility] 06/29/2016    HTN (hypertension) 06/15/2016    Depression 06/15/2016    Primary malignant neoplasm of liver ([facility] 06/15/2016    Insomnia 05/06/2011    GLENN (generalized anxiety disorder) 10/22/2010    RLS (restless legs syndrome) 10/22/2010    Tobacco abuse 10/22/2010     Allergies   Allergen Reactions    Lipitor [Atorvastatin] Swelling    Lisinopril Swelling     Past Medical History:   Diagnosis Date    Cirrhosis of liver ([facility]     Hepatitis C     Hypertension      Past Surgical History:   Procedure Laterality Date    COLONOSCOPY N/A 8/12/2016    COLONOSCOPY with polypectomy and biopsy performed by Felisha Vo MD at [address] HX CHOLECYSTECTOMY  08/2016    HX OTHER SURGICAL      Liver mass removal     No family history on file. Social History     Tobacco Use    Smoking status: Current Some Day Smoker     Packs/day: 0.33    Smokeless tobacco: Never Used   Substance Use Topics    Alcohol use: No     Alcohol/week: 0.0 standard drinks       Review of Systems   Constitutional: Negative for chills and fever. Respiratory: Negative for shortness of breath and wheezing.     Cardiovascular: Negative for chest pain and palpitations. All other systems reviewed and are negative. Objective:   No flowsheet data found. General: alert, cooperative, no distress   Mental  status: normal mood, behavior, speech, dress, motor activity, and thought processes, able to follow commands   HENT: NCAT   Neck: no visualized mass   Resp: no respiratory distress   Neuro: no gross deficits   Skin: no discoloration or lesions of concern on visible areas   Psychiatric: normal affect, consistent with stated mood, no evidence of hallucinations     Additional exam findings: none      We discussed the expected course, resolution and complications of the diagnosis(es) in detail. Medication risks, benefits, costs, interactions, and alternatives were discussed as indicated. I advised her to contact the office if her condition worsens, changes or fails to improve as anticipated. She expressed understanding with the diagnosis(es) and plan. Anup Taylor, who was evaluated through a patient-initiated, synchronous (real-time) audio-video encounter, and/or her healthcare decision maker, is aware that it is a billable service, with coverage as determined by her insurance carrier. She provided verbal consent to proceed: Yes, and patient identification was verified. It was conducted pursuant to the emergency declaration under the [facility], [address] [place] [address] authority and the [employer] General Act. A caregiver was present when appropriate. Ability to conduct physical exam was limited. I was in the office. The patient was at home.       Yoel Duenas MD

## 2020-12-02 NOTE — PATIENT INSTRUCTIONS

## 2020-12-15 ENCOUNTER — TELEPHONE (OUTPATIENT)
Dept: FAMILY MEDICINE CLINIC | Age: 65
End: 2020-12-15

## 2020-12-15 NOTE — TELEPHONE ENCOUNTER
Patient called office to see why her medication had not been called in. Patient was told 2 medications had been sent in on 12/2/20. Patient states that she was not asking about those 2. Patient was asked if she knew what the name of the medication was and she told me no. I told her she would need to get the pharmacy to request which medicaiton she needed filled. Patient became irate and started screaming. She told me to just look it up. It was a blue and a yellow pill. I told her I needed the name and could not put in a blue and a yellow request. She told me just tell Dr Nisha Silva. I again told her that we needed the names of the meds. She continued to scream and I then told her I needed her to stop screaming. or I would have to hang up the phone. She continued to scream and I disconnected the call.

## 2020-12-18 DIAGNOSIS — I10 ESSENTIAL HYPERTENSION: ICD-10-CM

## 2020-12-20 NOTE — TELEPHONE ENCOUNTER
----- Message from Lei Holder sent at 12/15/2020  3:45 PM EST -----  Regarding: refills  Please contact pt to refill medication. She states she has been trying to get refills for quite some time.  [phone]

## 2020-12-21 RX ORDER — METOPROLOL TARTRATE 100 MG/1
100 TABLET ORAL DAILY
Qty: 90 TAB | Refills: 1 | Status: CANCELLED | OUTPATIENT
Start: 2020-12-21

## 2020-12-21 NOTE — TELEPHONE ENCOUNTER
Lidocaine was last given by an ER physician per the pharmacy. Please sign if appropriate. Last Visit: 12/2/20 with MD James Crawley  Next Appointment: 4/26/21 with MD James Crawley  Previous Refill Encounter(s): 8/26/20 Hyzaar #90    Requested Prescriptions     Pending Prescriptions Disp Refills    losartan-hydroCHLOROthiazide (HYZAAR) 100-25 mg per tablet [Pharmacy Med Name: LOSARTAN-HCTZ 100-25 MG TAB] 90 Tab 1     Sig: take 1 tablet by mouth once daily    lidocaine (LIDODERM) 5 % 30 Patch 0     Sig: apply 1 patch to the affected area daily. Leave on for 12 hours and then off for 12 hours. Patient still has refills at the pharmacy for Metoprolol. I contacted the pharmacy to confirm and requested they refill it for the patient.

## 2020-12-21 NOTE — TELEPHONE ENCOUNTER
Pt calling requesting update on medication refill. Pt is out of HTN med over a week. Pt also requesting anxiety medication.

## 2020-12-24 RX ORDER — LOSARTAN POTASSIUM AND HYDROCHLOROTHIAZIDE 25; 100 MG/1; MG/1
TABLET ORAL
Qty: 90 TAB | Refills: 1 | Status: SHIPPED | OUTPATIENT
Start: 2020-12-24 | End: 2021-05-13 | Stop reason: SDUPTHER

## 2020-12-24 RX ORDER — LIDOCAINE 50 MG/G
PATCH TOPICAL
Qty: 30 PATCH | Refills: 0 | Status: SHIPPED | OUTPATIENT
Start: 2020-12-24 | End: 2021-05-13 | Stop reason: SDUPTHER

## 2020-12-24 NOTE — TELEPHONE ENCOUNTER
Buspar was sent to the pharmacy on 12/2/20 for #60 with 5 refills. Patient should contact the pharmacy for a refill.

## 2020-12-29 ENCOUNTER — HOSPITAL ENCOUNTER (OUTPATIENT)
Dept: PREADMISSION TESTING | Age: 65
Discharge: HOME | End: 2020-12-29
Payer: MEDICAID

## 2020-12-29 PROCEDURE — 87635 SARS-COV-2 COVID-19 AMP PRB: CPT

## 2020-12-30 ENCOUNTER — TELEPHONE (OUTPATIENT)
Dept: FAMILY MEDICINE CLINIC | Age: 65
End: 2020-12-30

## 2020-12-30 LAB — SARS-COV-2, COV2NT: NOT DETECTED

## 2020-12-30 NOTE — TELEPHONE ENCOUNTER
Pt called states pain management had not rcvd her referral and that she wants her medication to be sent to the pharmacy. Was sent and recvd on 12/2/20, pt has appt scheduled in march but feels should not have to wait and that pcp should give her the meds. Adv that the pain meds are ordered thru pain management.  Also adv that losartan-hydroCHLOROthiazide was sent to the pharmacy on 12/24

## 2021-01-19 ENCOUNTER — VIRTUAL VISIT (OUTPATIENT)
Dept: FAMILY MEDICINE CLINIC | Age: 66
End: 2021-01-19

## 2021-01-19 NOTE — PROGRESS NOTES
Attempted doxy telehealth virtual visit via text with pt ; no connection made. Will close encounter at this time. This encounter was created in error - please disregard.

## 2021-02-02 ENCOUNTER — HOSPITAL ENCOUNTER (OUTPATIENT)
Dept: PREADMISSION TESTING | Age: 66
Discharge: HOME | End: 2021-02-02
Payer: MEDICAID

## 2021-02-02 DIAGNOSIS — Z01.812 PRE-PROCEDURE LAB EXAM: Primary | ICD-10-CM

## 2021-02-02 PROCEDURE — U0003 INFECTIOUS AGENT DETECTION BY NUCLEIC ACID (DNA OR RNA); SEVERE ACUTE RESPIRATORY SYNDROME CORONAVIRUS 2 (SARS-COV-2) (CORONAVIRUS DISEASE [COVID-19]), AMPLIFIED PROBE TECHNIQUE, MAKING USE OF HIGH THROUGHPUT TECHNOLOGIES AS DESCRIBED BY CMS-2020-01-R: HCPCS

## 2021-02-03 LAB — SARS-COV-2, COV2NT: NOT DETECTED

## 2021-02-04 ENCOUNTER — VIRTUAL VISIT (OUTPATIENT)
Dept: FAMILY MEDICINE CLINIC | Age: 66
End: 2021-02-04
Payer: MEDICAID

## 2021-02-04 DIAGNOSIS — F20.1 DISORGANIZED SCHIZOPHRENIA (HCC): ICD-10-CM

## 2021-02-04 DIAGNOSIS — C22.1 CHOLANGIOCARCINOMA (HCC): ICD-10-CM

## 2021-02-04 DIAGNOSIS — M54.2 NECK PAIN: Primary | ICD-10-CM

## 2021-02-04 DIAGNOSIS — K74.60 CIRRHOSIS OF LIVER WITHOUT ASCITES, UNSPECIFIED HEPATIC CIRRHOSIS TYPE (HCC): ICD-10-CM

## 2021-02-04 PROCEDURE — 99214 OFFICE O/P EST MOD 30 MIN: CPT | Performed by: FAMILY MEDICINE

## 2021-02-07 NOTE — PATIENT INSTRUCTIONS
Neck Pain: Care Instructions Your Care Instructions You can have neck pain anywhere from the bottom of your head to the top of your shoulders. It can spread to the upper back or arms. Injuries, painting a ceiling, sleeping with your neck twisted, staying in one position for too long, and many other activities can cause neck pain. Most neck pain gets better with home care. Your doctor may recommend medicine to relieve pain or relax your muscles. He or she may suggest exercise and physical therapy to increase flexibility and relieve stress. You may need to wear a special (cervical) collar to support your neck for a day or two. Follow-up care is a key part of your treatment and safety. Be sure to make and go to all appointments, and call your doctor if you are having problems. It's also a good idea to know your test results and keep a list of the medicines you take. How can you care for yourself at home? · Try using a heating pad on a low or medium setting for 15 to 20 minutes every 2 or 3 hours. Try a warm shower in place of one session with the heating pad. · You can also try an ice pack for 10 to 15 minutes every 2 to 3 hours. Put a thin cloth between the ice and your skin. · Take pain medicines exactly as directed. ? If the doctor gave you a prescription medicine for pain, take it as prescribed. ? If you are not taking a prescription pain medicine, ask your doctor if you can take an over-the-counter medicine. · If your doctor recommends a cervical collar, wear it exactly as directed. When should you call for help? Call your doctor now or seek immediate medical care if: 
  · You have new or worsening numbness in your arms, buttocks or legs.  
  · You have new or worsening weakness in your arms or legs. (This could make it hard to stand up.)  
  · You lose control of your bladder or bowels. Watch closely for changes in your health, and be sure to contact your doctor if:   · Your neck pain is getting worse.  
  · You are not getting better after 1 week.  
  · You do not get better as expected. Where can you learn more? Go to http://www.gray.com/ Enter [id number] in the search box to learn more about \"Neck Pain: Care Instructions. \" Current as of: March 2, 2020               Content Version: 12.6 © 3205-0557 [employer]. Care instructions adapted under license by [employer] (which disclaims liability or warranty for this information). If you have questions about a medical condition or this instruction, always ask your healthcare professional. [address] any warranty or liability for your use of this information.
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
## 2021-02-08 ENCOUNTER — ANESTHESIA (OUTPATIENT)
Dept: ENDOSCOPY | Age: 66
End: 2021-02-08
Payer: MEDICAID

## 2021-02-08 ENCOUNTER — OFFICE VISIT (OUTPATIENT)
Dept: HEMATOLOGY | Age: 66
End: 2021-02-08

## 2021-02-08 ENCOUNTER — ANESTHESIA EVENT (OUTPATIENT)
Dept: ENDOSCOPY | Age: 66
End: 2021-02-08
Payer: MEDICAID

## 2021-02-08 ENCOUNTER — HOSPITAL ENCOUNTER (OUTPATIENT)
Age: 66
Setting detail: OUTPATIENT SURGERY
Discharge: HOME | End: 2021-02-08
Attending: INTERNAL MEDICINE | Admitting: INTERNAL MEDICINE
Payer: MEDICAID

## 2021-02-08 VITALS
DIASTOLIC BLOOD PRESSURE: 95 MMHG | WEIGHT: 140 LBS | HEART RATE: 54 BPM | HEIGHT: 62 IN | SYSTOLIC BLOOD PRESSURE: 154 MMHG | RESPIRATION RATE: 16 BRPM | BODY MASS INDEX: 25.76 KG/M2 | TEMPERATURE: 97.6 F | OXYGEN SATURATION: 100 %

## 2021-02-08 DIAGNOSIS — Z00.00 NORMAL EXAM: ICD-10-CM

## 2021-02-08 PROCEDURE — 43235 EGD DIAGNOSTIC BRUSH WASH: CPT | Performed by: INTERNAL MEDICINE

## 2021-02-08 PROCEDURE — 74011250636 HC RX REV CODE- 250/636: Performed by: INTERNAL MEDICINE

## 2021-02-08 PROCEDURE — 76060000032 HC ANESTHESIA 0.5 TO 1 HR: Performed by: INTERNAL MEDICINE

## 2021-02-08 PROCEDURE — 74011250636 HC RX REV CODE- 250/636: Performed by: NURSE ANESTHETIST, CERTIFIED REGISTERED

## 2021-02-08 PROCEDURE — 74011250636 HC RX REV CODE- 250/636: Performed by: STUDENT IN AN ORGANIZED HEALTH CARE EDUCATION/TRAINING PROGRAM

## 2021-02-08 PROCEDURE — 76040000007: Performed by: INTERNAL MEDICINE

## 2021-02-08 PROCEDURE — 2709999900 HC NON-CHARGEABLE SUPPLY: Performed by: INTERNAL MEDICINE

## 2021-02-08 RX ORDER — NALOXONE HYDROCHLORIDE 0.4 MG/ML
0.4 INJECTION, SOLUTION INTRAMUSCULAR; INTRAVENOUS; SUBCUTANEOUS
Status: CANCELLED | OUTPATIENT
Start: 2021-02-08 | End: 2021-02-08

## 2021-02-08 RX ORDER — DEXTROMETHORPHAN/PSEUDOEPHED 2.5-7.5/.8
1.2 DROPS ORAL
Status: CANCELLED | OUTPATIENT
Start: 2021-02-08

## 2021-02-08 RX ORDER — FLUMAZENIL 0.1 MG/ML
0.2 INJECTION INTRAVENOUS
Status: CANCELLED | OUTPATIENT
Start: 2021-02-08 | End: 2021-02-08

## 2021-02-08 RX ORDER — ATROPINE SULFATE 0.1 MG/ML
0.5 INJECTION INTRAVENOUS
Status: CANCELLED | OUTPATIENT
Start: 2021-02-08 | End: 2021-02-09

## 2021-02-08 RX ORDER — PROPOFOL 10 MG/ML
INJECTION, EMULSION INTRAVENOUS AS NEEDED
Status: DISCONTINUED | OUTPATIENT
Start: 2021-02-08 | End: 2021-02-08 | Stop reason: HOSPADM

## 2021-02-08 RX ORDER — QUETIAPINE FUMARATE 25 MG/1
25 TABLET, FILM COATED ORAL
COMMUNITY
End: 2021-05-13 | Stop reason: SDUPTHER

## 2021-02-08 RX ORDER — EPINEPHRINE 0.1 MG/ML
1 INJECTION INTRACARDIAC; INTRAVENOUS
Status: CANCELLED | OUTPATIENT
Start: 2021-02-08 | End: 2021-02-09

## 2021-02-08 RX ORDER — ONDANSETRON 2 MG/ML
INJECTION INTRAMUSCULAR; INTRAVENOUS AS NEEDED
Status: DISCONTINUED | OUTPATIENT
Start: 2021-02-08 | End: 2021-02-08 | Stop reason: HOSPADM

## 2021-02-08 RX ORDER — SODIUM CHLORIDE 0.9 % (FLUSH) 0.9 %
5-40 SYRINGE (ML) INJECTION AS NEEDED
Status: CANCELLED | OUTPATIENT
Start: 2021-02-08

## 2021-02-08 RX ORDER — SODIUM CHLORIDE 0.9 % (FLUSH) 0.9 %
5-40 SYRINGE (ML) INJECTION EVERY 8 HOURS
Status: CANCELLED | OUTPATIENT
Start: 2021-02-08

## 2021-02-08 RX ORDER — SODIUM CHLORIDE 9 MG/ML
100 INJECTION, SOLUTION INTRAVENOUS CONTINUOUS
Status: DISCONTINUED | OUTPATIENT
Start: 2021-02-08 | End: 2021-02-08 | Stop reason: HOSPADM

## 2021-02-08 RX ADMIN — ONDANSETRON HYDROCHLORIDE 4 MG: 2 INJECTION INTRAMUSCULAR; INTRAVENOUS at 11:34

## 2021-02-08 RX ADMIN — PROPOFOL 70 MG: 10 INJECTION, EMULSION INTRAVENOUS at 11:49

## 2021-02-08 RX ADMIN — SODIUM CHLORIDE 100 ML/HR: 900 INJECTION, SOLUTION INTRAVENOUS at 11:15

## 2021-02-08 RX ADMIN — PROPOFOL 20 MG: 10 INJECTION, EMULSION INTRAVENOUS at 11:51

## 2021-02-08 NOTE — DISCHARGE INSTRUCTIONS
DISCHARGE SUMMARY from Nurse    PATIENT INSTRUCTIONS:    After general anesthesia or intravenous sedation, for 24 hours or while taking prescription Narcotics:  · Limit your activities  · Do not drive and operate hazardous machinery  · Do not make important personal or business decisions  · Do  not drink alcoholic beverages  · If you have not urinated within 8 hours after discharge, please contact your surgeon on call. Report the following to your surgeon:  · Excessive pain, swelling, redness or odor of or around the surgical area  · Temperature over 100.5  · Nausea and vomiting lasting longer than 4 hours or if unable to take medications  · Any signs of decreased circulation or nerve impairment to extremity: change in color, persistent  numbness, tingling, coldness or increase pain  · Any questions    What to do at Home:  Recommended activity: AS ABOVE     If you experience any of the following symptoms as above , please follow up with Doctor Ladi Mak. *  Please give a list of your current medications to your Primary Care Provider. *  Please update this list whenever your medications are discontinued, doses are      changed, or new medications (including over-the-counter products) are added. *  Please carry medication information at all times in case of emergency situations. These are general instructions for a healthy lifestyle:    No smoking/ No tobacco products/ Avoid exposure to second hand smoke  Surgeon General's Warning:  Quitting smoking now greatly reduces serious risk to your health.     Obesity, smoking, and sedentary lifestyle greatly increases your risk for illness    A healthy diet, regular physical exercise & weight monitoring are important for maintaining a healthy lifestyle    You may be retaining fluid if you have a history of heart failure or if you experience any of the following symptoms:  Weight gain of 3 pounds or more overnight or 5 pounds in a week, increased swelling in our hands or feet or shortness of breath while lying flat in bed. Please call your doctor as soon as you notice any of these symptoms; do not wait until your next office visit. The discharge information has been reviewed with the patient and spouse. The patient and spouse verbalized understanding. Discharge medications reviewed with the patient and spouse and appropriate educational materials and side effects teaching were provided. ___________________________________________________________________________________________________________________________________BON Deshawn Allison MD, Dimitri Lilly, Gregg Palacios MD, MPH      Everett Alford, PAOCTAVIO Louis, [facility]     Lalitha Callejas, [facility]   Elizabeth Constantino FNP-C    Tamera Greer, [facility]       [facility] 136    at [address]    [address] [place] [address], [facility] Madi Sherman  22. [phone]    FAX: [address] [place] [address], [facility] [address] [place] [address]    [phone]    FAX: [phone]         ENDOSCOPY DISCHARGE INSTRUCTIONS      Gerri Bedolla  1955  Date: 2/8/2021    DISCOMFORT:  Use lozenges or warm salt water gargle for sore thoat  Apply warm compress to IV site if red. If redness or soreness persists call the office. You may experience gas and bloating. Walking and belching will help relieve this. You may experience chest discomfort or pressure especially if banding of esophageal varices was performed. DIET:  You may resume your normal diet. ACTIVITY:  Spend the remainder of the day resting. Avoid any strenuous activity. You may not operate a vehicle for 12 hours. You may not engage in an occupation involving machinery or appliances for rest of today.    Avoid making any critical or financial decisions for at least 24 hour. Call the [employer] [facility] [address] if you have any of the following:  Increasing chest or abdominal pain, nausea, vomiting, vomiting blood, abdominal distension or swelling, fever or chills, bloody discharge from nose or mouth or shortness of breath. Follow-up Instructions:  Call Dr. Sidney Abebe for any questions or problems at the phone number listed above. If a biopsy was performed, you will be contacted by the office staff or Dr Sidney Abebe within 1 week. If you have not heard from us by then you may call the office at the phone number listed above to inquire about the results. ENDOSCOPY FINDINGS:  Your endoscopy demonstrated ***. A biopsy of the stomach was taken. Will repeat EGD to look for development of varices in 2 years. Keep follow-up appointment with Alfred Ryan on 2/17/2021. DISCHARGE SUMMARY from the Nurse: The following personal items collected during your admission are returned to you:   Dental Appliance: Dental Appliances: None  Vision: Visual Aid: Glasses, With patient  Hearing Aid:    Jewelry:    Clothing:    Other Valuables:    Valuables sent to safe:                          Learning About Coronavirus (COVID-19)  Coronavirus (COVID-19): Overview  What is coronavirus (GKKEW-93)? The coronavirus disease (COVID-19) is caused by a virus. It is an illness that was first found in [place], [place], in December 2019. It has since spread worldwide. The virus can cause fever, cough, and trouble breathing. In severe cases, it can cause pneumonia and make it hard to breathe without help. It can cause death. Coronaviruses are a large group of viruses. They cause the common cold. They also cause more serious illnesses like Middle East respiratory syndrome (MERS) and severe acute respiratory syndrome (SARS). COVID-19 is caused by a novel coronavirus. That means it's a new type that has not been seen in people before.   This virus spreads person-to-person through droplets from coughing and sneezing. It can also spread when you are close to someone who is infected. And it can spread when you touch something that has the virus on it, such as a doorknob or a tabletop. What can you do to protect yourself from coronavirus (COVID-19)? The best way to protect yourself from getting sick is to:  · Avoid areas where there is an outbreak. · Avoid contact with people who may be infected. · Wash your hands often with soap or alcohol-based hand sanitizers. · Avoid crowds and try to stay at least 6 feet away from other people. · Wash your hands often, especially after you cough or sneeze. Use soap and water, and scrub for at least 20 seconds. If soap and water aren't available, use an alcohol-based hand . · Avoid touching your mouth, nose, and eyes. What can you do to avoid spreading the virus to others? To help avoid spreading the virus to others:  · Cover your mouth with a tissue when you cough or sneeze. Then throw the tissue in the trash. · Use a disinfectant to clean things that you touch often. · Stay home if you are sick or have been exposed to the virus. Don't go to school, work, or public areas. And don't use public transportation. · If you are sick:  ? Leave your home only if you need to get medical care. But call the doctor's office first so they know you're coming. And wear a face mask, if you have one.  ? If you have a face mask, wear it whenever you're around other people. It can help stop the spread of the virus when you cough or sneeze. ? Clean and disinfect your home every day. Use household  and disinfectant wipes or sprays. Take special care to clean things that you grab with your hands. These include doorknobs, remote controls, phones, and handles on your refrigerator and microwave. And don't forget countertops, tabletops, bathrooms, and computer keyboards.   When to call for help  Call 911 anytime you think you may need emergency care. For example, call if:  · You have severe trouble breathing. (You can't talk at all.)  · You have constant chest pain or pressure. · You are severely dizzy or lightheaded. · You are confused or can't think clearly. · Your face and lips have a blue color. · You pass out (lose consciousness) or are very hard to wake up. Call your doctor now if you develop symptoms such as:  · Shortness of breath. · Fever. · Cough. If you need to get care, call ahead to the doctor's office for instructions before you go. Make sure you wear a face mask, if you have one, to prevent exposing other people to the virus. Where can you get the latest information? The following health organizations are tracking and studying this virus. Their websites contain the most up-to-date information. Goran Melo also learn what to do if you think you may have been exposed to the virus. · U.S. Centers for Disease Control and Prevention (CDC): The CDC provides updated news about the disease and travel advice. The website also tells you how to prevent the spread of infection. www.cdc.gov  · World Health Organization [facility]): WHO offers information about the virus outbreaks. WHO also has travel advice. www.who.int  Current as of: April 1, 2020               Content Version: 12.4  © 9975-8341 Health[employer], Incorporated. Care instructions adapted under license by your healthcare professional. If you have questions about a medical condition or this instruction, always ask your healthcare professional. [place] [address] any warranty or liability for your use of this information.   Patient armband removed and shredded

## 2021-02-08 NOTE — H&P
Alec Moreno MD, Louis Corrigan MD, MPH      Namita Nunez, PASaraiC    Alvaro Mcnair, [facility]     Lalitha Callejas, [facility]   Srinivasa Licea NATE-DARIANA Ramesh, [facility]       Kimberly Vazquez De Mccarty 136    at [address], [address] Madi Sherman  22.    [phone]    FAX: [address] [place] [address], [address]    [phone]    FAX: [phone]         PRE-PROCEDURE NOTE - EGD    H and P from last office visit reviewed. Allergies reviewed. Out-patient medicaton list reviewed. 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 Current Outpatient Medications on File Prior to Encounter   Medication Sig Dispense Refill    QUEtiapine (SEROqueL) 25 mg tablet Take 25 mg by mouth nightly.  losartan-hydroCHLOROthiazide (HYZAAR) 100-25 mg per tablet take 1 tablet by mouth once daily 90 Tab 1    busPIRone (BUSPAR) 30 mg tablet Take 1 Tab by mouth two (2) times a day. 60 Tab 5    cloNIDine HCL (CATAPRES) 0.1 mg tablet Take 1 Tab by mouth nightly.  90 Tab 1    metoprolol tartrate (LOPRESSOR) 100 mg IR tablet Take 1 Tab by mouth daily. 90 Tab 1    lidocaine (LIDODERM) 5 % apply 1 patch to the affected area daily. Leave on for 12 hours and then off for 12 hours. 30 Patch 0       For EGD to assess for esophageal and gastric varices. Plan to perform banding if indicated based upon variceal size and appearance. The risks of the procedure were discussed with the patient. These included reaction to anesthesia, pain, perforation and bleeding. All questions were answered. The patient wishes to proceed with the procedure. The patient was counseled at length about the risks of subha Covid-19 in the valeriy-operative and post-operative states including the recovery window of their procedure. The patient was made aware that subha Covid-19 after a surgical procedure may worsen their prognosis for recovering from the virus and lend to a higher morbidity and or mortality risk. The patient was given the options of postponing their procedure. All of the risks, benefits, and alternatives were discussed. The patient does  wish to proceed with the procedure. PHYSICAL EXAMINATION:  Visit Vitals  BP (!) 152/99   Pulse (!) 58   Temp 97.3 °F (36.3 °C)   Resp 16   Ht 5' 2\" (1.575 m)   Wt 140 lb (63.5 kg)   SpO2 100%   BMI 25.61 kg/m²       General: No acute distress. Eyes: Sclera anicteric. ENT: No oral lesions. Thyroid normal.  Nodes: No adenopathy. Skin: No spider angiomata. No jaundice. No palmar erythema. Respiratory: Lungs clear to auscultation. Cardiovascular: Regular heart rate. No murmurs. No JVD. Abdomen: Soft non-tender, liver size normal to percussion/palpation. Spleen not palpable. No obvious ascites. Extremities: No edema. No muscle wasting. No gross arthritic changes. Neurologic: Alert and oriented. Cranial nerves grossly intact. No asterixis.       MOST RECENT LABORATORY STUDIES:  Lab Results   Component Value Date/Time    WBC 8.5 08/26/2020 02:55 PM Hemoglobin, POC 15.6 08/12/2016 11:09 AM    HGB 14.2 08/26/2020 02:55 PM    Hematocrit, POC 46 08/12/2016 11:09 AM    HCT 41.6 08/26/2020 02:55 PM    PLATELET 415 72/80/1354 02:55 PM    MCV 91 08/26/2020 02:55 PM     Lab Results   Component Value Date/Time    INR 1.0 08/26/2020 02:55 PM    INR 0.9 05/30/2019 03:26 PM    INR 1.00 06/08/2018 01:32 PM    Prothrombin time 10.6 08/26/2020 02:55 PM    Prothrombin time 12.1 05/30/2019 03:26 PM    Prothrombin time 10.4 06/08/2018 01:32 PM       ASSESSMENT AND PLAN:  EGD to assess for esophageal and/or gastric varices.   Sedation per anesthesiology      MD Bruce Escalante[address], [address]  [phone]  [address] [place] [address]

## 2021-02-08 NOTE — PROCEDURES
[address] [facility] Louisa Forster, MD, MPH      Philly Martin, FANNY Hernandez, [facility]     Lalitha Callejas, [facility]   Jeannie Cabrera P-DARIANA Stewart, [facility]       Kimberly Hays [facility] 136    at [address], [facility] Madi Goss  22.    [phone]    FAX: [address] [place] [address], [address] [place] [address] - [address]    [phone]    FAX: [phone]         UPPER ENDOSCOPY PROCEDURE NOTE    [facility]  1955    INDICATION: Cirrhosis. Screening for esophageal varices with variceal ligation if  indicated. : Jr Brito MD    SURGICAL ASSISTANT:  None    PROSTHETIC DEVISES, TISSUE GRAFTS, ORGAN TRANSPLANTS:  Not applicable     ANESTHESIA/SEDATION: Sedation per anesthesiology      PROCEDURE DESCRIPTION:  [employer] consent was obtained from the patient for the procedure. All risks and benefits of the procedure explained. The procedure was performed in the endoscopy suite. The patient was laying on a stretcher and moved to the left lateral decubitus position prior to administration of sedation. Sedation was administered by anesthesiology. See their note for details. The endoscope was inserted into the mouth and advanced under direct vision to the second portion of the duodenum. Careful inspection of upper gastrointestinal tract was made as the endoscope was inserted and withdrawn. Retroflexion of the endoscope to view of the cardia of the stomach was performed. The findings and interventions are described below. FINDINGS:  Esophagus:    Normal.      Stomach:   Normal  No gastric varices identified.     Duodenum:   Normal bulb and second portion    SPECIMENS COLLECTED: None    INTERVENTIONS:  None    COMPLICATIONS: None. The patient tolerated the procedure well. EBL: Negligible. RECOMMENDATIONS:  Observe until discharge parameters are achieved. May resume previous diet. Repeat endoscopy to reassess varices and need for banding in 3 years. Follow-up [facility] office as scheduled.       Dorothy Hernández MD  [address]  4 [facility], [address] [place] [address] [place], [address]  12 Critical access hospital

## 2021-02-08 NOTE — ANESTHESIA PREPROCEDURE EVALUATION
Relevant Problems   No relevant active problems       Anesthetic History   No history of anesthetic complications     Pertinent negatives: No PONV       Review of Systems / Medical History  Patient summary reviewed, nursing notes reviewed and pertinent labs reviewed    Pulmonary  Within defined limits        Smoker    Pertinent negatives: No COPD, asthma and sleep apnea     Neuro/Psych         Psychiatric history  Pertinent negatives: No seizures and CVA   Cardiovascular    Hypertension            Pertinent negatives: No past MI and CHF       GI/Hepatic/Renal       Hepatitis: type C    Liver disease  Pertinent negatives: No renal disease  Comments: cholangiosarcoma Endo/Other          Pertinent negatives: No diabetes   Other Findings              Physical Exam    Airway  Mallampati: II  TM Distance: 4 - 6 cm  Neck ROM: normal range of motion   Mouth opening: Normal     Cardiovascular    Rhythm: regular  Rate: normal         Dental  No notable dental hx       Pulmonary  Breath sounds clear to auscultation               Abdominal  GI exam deferred       Other Findings            Anesthetic Plan    ASA: 3  Anesthesia type: MAC          Induction: Intravenous  Anesthetic plan and risks discussed with: Patient

## 2021-02-08 NOTE — ANESTHESIA POSTPROCEDURE EVALUATION
Post-Anesthesia Evaluation and Assessment    Cardiovascular Function/Vital Signs  Visit Vitals  BP (!) 154/95   Pulse (!) 54   Temp 36.4 °C (97.6 °F)   Resp 16   Ht 5' 2\" (1.575 m)   Wt 63.5 kg (140 lb)   SpO2 100%   BMI 25.61 kg/m²       Patient is status post Procedure(s):  EGD WITH MAC. Nausea/Vomiting: Controlled. Postoperative hydration reviewed and adequate. Pain:  Pain Scale 1: Visual (02/08/21 1210)  Pain Intensity 1: 0 (02/08/21 1210)   Managed. Neurological Status: At baseline. Mental Status and Level of Consciousness: Arousable. Pulmonary Status:   O2 Device: Room air (02/08/21 1201)   Adequate oxygenation and airway patent. Complications related to anesthesia: None    Post-anesthesia assessment completed. No concerns. Patient has met all discharge requirements.     Signed By: Madhavi Nuñez CRNA    February 8, 2021

## 2021-02-25 ENCOUNTER — VIRTUAL VISIT (OUTPATIENT)
Dept: FAMILY MEDICINE CLINIC | Age: 66
End: 2021-02-25

## 2021-02-25 ENCOUNTER — TELEPHONE (OUTPATIENT)
Dept: FAMILY MEDICINE CLINIC | Age: 66
End: 2021-02-25

## 2021-02-26 NOTE — PROGRESS NOTES
Attempted text to pt for virtual visit; no answer/connection made  This encounter was created in error - please disregard.

## 2021-02-27 ENCOUNTER — TELEPHONE (OUTPATIENT)
Dept: FAMILY MEDICINE CLINIC | Age: 66
End: 2021-02-27

## 2021-02-27 NOTE — TELEPHONE ENCOUNTER
----- Message from Kt Yun sent at 2/19/2021  3:49 PM EST -----  Regarding: medication  Contact pt regarding medication,  Methylprednisolone. Pt has an appt on 2/25.  Please call pt [phone]

## 2021-03-16 RX ORDER — QUETIAPINE FUMARATE 25 MG/1
25 TABLET, FILM COATED ORAL
Qty: 90 TAB | Refills: 1 | Status: CANCELLED | OUTPATIENT
Start: 2021-03-16

## 2021-03-16 NOTE — TELEPHONE ENCOUNTER
Last Visit: 12/2/20 with MD Vargas Richards  Next Appointment: 4/26/21 with MD Vargas Richards  Previous Refill Encounter(s): 9/30/20 #90 with 1 refill    Requested Prescriptions     Pending Prescriptions Disp Refills    QUEtiapine (SEROqueL) 25 mg tablet 90 Tab 1     Sig: Take 1 Tab by mouth nightly.

## 2021-04-01 ENCOUNTER — VIRTUAL VISIT (OUTPATIENT)
Dept: HEMATOLOGY | Age: 66
End: 2021-04-01
Payer: MEDICAID

## 2021-04-01 DIAGNOSIS — K74.60 CIRRHOSIS OF LIVER WITHOUT ASCITES, UNSPECIFIED HEPATIC CIRRHOSIS TYPE (HCC): Primary | ICD-10-CM

## 2021-04-01 PROCEDURE — 99214 OFFICE O/P EST MOD 30 MIN: CPT | Performed by: NURSE PRACTITIONER

## 2021-04-01 NOTE — PROGRESS NOTES
[address] [facility], MD, Annell Rana, Jeri Fleeting, MD Danita Holter, PAOCTAVIO Saenz, ACNP-BC     Lalitha Callejas, [facility]   ISAC Harris-DARIANA Garsia, [facility]       Kimberly Vazquez [address]    at [address], [address] [place] [address], [address].    [phone]    FAX: [address] [place] [address]    at [address], [address] [phone]    FAX: [phone]       PCP: Patient Care Team:  Edmond Morales MD as PCP - General (Family Medicine)  Edmond Morales MD as PCP - [facility]  Julieta Mariano MD (Physical Medicine and Rehabilitation)    Problem List  Date Reviewed: 2/7/2021          Codes Class Noted    HCV antibody positive ICD-10-CM: R76.8  ICD-9-CM: 795.79  5/30/2019    Overview Signed 5/30/2019  4:21 PM by Briana Lopez NP     HCV treated and cured with Zepatier in Jan., 2018.               Disorganized schizophrenia ([facility] ICD-10-CM: F20.1  ICD-9-CM: 295.10  12/19/2016        Severe episode of recurrent major depressive disorder, with psychotic features ([facility].) ICD-10-CM: F33.3  ICD-9-CM: 296.34  12/19/2016        Liver mass ICD-10-CM: R16.0  ICD-9-CM: 573.8  8/16/2016        Cholangiocarcinoma (HCC) (Chronic) ICD-10-CM: C22.1  ICD-9-CM: 155.1  6/29/2016        HTN (hypertension) ICD-10-CM: I10  ICD-9-CM: 401.9  6/15/2016        Depression ICD-10-CM: F32.9  ICD-9-CM: 215  6/15/2016        Primary malignant neoplasm of liver (HCC) ICD-10-CM: C22.8  ICD-9-CM: 155.0  6/15/2016        Insomnia ICD-10-CM: G47.00  ICD-9-CM: 780.52  5/6/2011        GLENN (generalized anxiety disorder) ICD-10-CM: F41.1  ICD-9-CM: 300.02  10/22/2010        RLS (restless legs syndrome) ICD-10-CM: G25.81  ICD-9-CM: 333.94  10/22/2010        Tobacco abuse ICD-10-CM: Z72.0  ICD-9-CM: 305.1  10/22/2010            VIRTUAL TELEHEALTH VISIT PERFORMED DUE TO COVID-19 EPIDEMIC    CONSENT:  Geovani Prajapati, who was seen by synchronous, real-time, audio-video technology, and/or her healthcare decision maker, is aware that this patient-initiated, Telehealth encounter on 4/1/2021 is a billable service, with coverage as determined by her insurance carrier. She is aware that she may receive a bill and has provided verbal consent to proceed. This patient was evaluated during a Virtual Telehealth visit. A caregiver was present if appropriate. Due to this being a TeleHealth encounter performed during the [id number]-92 public health emergency, the physical examination was limited to that listed in the [address] was seen today via virtual visit at the [address] [address] of [facility] regarding cholangiocarcinoma vs mixed unspecified type liver cancer and its management. HCV has been treated and cured with Zepatier in early 2018. The patient is a 72y.o. year old Black female with history of hepatitis C with evidence of cirrhosis on biopsy 2/2016. She was briefly treated with interferon over 15 years ago but did not tolerate it. Imaging via MRI  Feb 2016 demonstrated 5.2 x 2.8 cm cm right hepatic mass. The lesion was not classic for [address] This was biopsied and determined to be a poorly differentiated cholangiocarcinoma vs. Hep Par 1 negative HCC. There was also cirrhosis noted on this biopsy. In August of 2016 she had the tumor removed. Surgical margins were clear. Gallbladder was also removed. She was lost to follow up post operatively. She then went to see a GI closer to home to be treated for HCV and apparently was denied for uncertain reason. The patient has not developed edema. The patient has not developed hepatic encephalopathy. The patient underwent EGD in 02/2021.   Esophagus is normal. The most recent laboratory studies indicate that the liver transaminases are normal, alkaline phosphatase is normal, tests of hepatic synthetic and metabolic function are normal, and the platelet count is normal.      The patient has no symptoms which could be attributed to the liver disorder. The patient completes all daily activities without any functional limitations. The patient has not experienced fatigvers, chills, shortness of breath, chest pain, pain in the right side over the liver, diffuse abdominal pain, nausea, vomiting, constipation, diarrhea, dry eyes, dry mouth, arthralgias, myalgias, yellowing of the eyes or skin, itching, dark urine, problems concentrating, swelling of the abdomen, swelling of the lower extremities, hematemesis, hematochezia. Since the last office appointment the patient has:  Had no changes in the liver disease. 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 The most recent laboratory studies indicate that the liver transaminases are normal, alkaline phosphatase is normal, tests of hepatic synthetic and metabolic function are normal, and the platelet count is normal.  Will perform laboratory testing to monitor liver function and degree of liver injury. This will include hepatic panel, a CBC w/ diff, a BMP, a PT/INR, an AFP-L3%. Complications of cirrhosis were discussed in detail. We discussed thrombocytopenia, portal hypertension, varices, GI bleeding, peripheral edema, ascites, hepatic encephalopathy, and hepatocellular carcinoma. We discussed the need for follow ups on a regular basis to monitor for complications. We discussed the need for every 6 month liver imaging studies. She is s/p hepatic wedge resection of the tumor in 8/2016. Follow up imaging has been performed and no residual or new enhancing mass lesion was found.       Repeat imaging Q6 months to screen for HCC/cholanigiocarcinomas. Ultrasound ordered. Tumor markers ordered. Patient does have cirrhosis mentioned on biopsy though her liver function appears well preserved. The need to perform an assessment of liver fibrosis was discussed with the patient. Fibroscan can assess liver fibrosis and determine if a patient has advanced fibrosis or cirrhosis without the need for liver biopsy. This will be performed at her next appointment here in 3 months. The patient had genotype 1A and has previously failed interferon based therapy over 15 years ago. The patient began HCV in August or September, 2017 with once daily Zepatier. She was to be treated for 12 weeks total.  There seems to have been an interruption in her therapy and she may have missed as much as 7 days of treatment. She is a poor historian. I am not sure how adherent she was to the treatment regime, but she is cured of the HCV as evidenced by there being no detectable HCV RNA on labs from 05/2018, much further out than the 12 week SVR filemon. EGD was performed in 02/2021 and her she has a normal esophagus. Repeat EGD in 3 years. All of the above issues were discussed with the patient. All questions were answered. The patient expressed a clear understanding of the above. ALLERGIES  Allergies   Allergen Reactions    Lipitor [Atorvastatin] Swelling    Lisinopril Swelling       MEDICATIONS  Current Outpatient Medications   Medication Sig    QUEtiapine (SEROqueL) 25 mg tablet Take 25 mg by mouth nightly.  losartan-hydroCHLOROthiazide (HYZAAR) 100-25 mg per tablet take 1 tablet by mouth once daily    lidocaine (LIDODERM) 5 % apply 1 patch to the affected area daily. Leave on for 12 hours and then off for 12 hours.  busPIRone (BUSPAR) 30 mg tablet Take 1 Tab by mouth two (2) times a day.  cloNIDine HCL (CATAPRES) 0.1 mg tablet Take 1 Tab by mouth nightly.     metoprolol tartrate (LOPRESSOR) 100 mg IR tablet Take 1 Tab by mouth daily. No current facility-administered medications for this visit. SYSTEM REVIEW NOT RELATED TO LIVER DISEASE OR REVIEWED ABOVE:  Constitution systems: Negative for fever, chills, weight gain, weight loss. Eyes: Negative for visual changes. ENT: Negative for sore throat, painful swallowing. Respiratory: Negative for cough, hemoptysis, SOB. Cardiology: Negative for chest pain, palpitations. GI:  Negative for constipation or diarrhea. : Negative for urinary frequency, dysuria, hematuria, nocturia. Skin: Negative for rash. Hematology: Negative for easy bruising, blood clots. Musculo-skelatal: Negative for back pain, muscle pain, weakness. Neurologic: Negative for headaches, dizziness, vertigo, memory problems not related to HE. Psychology: Negative for anxiety, depression. FAMILY HISTORY:  The father Passed away. No knowledge of medical histry  The mother passed from kidney disease. There is no family history of liver disease. SOCIAL HISTORY:  The patient is . The patient has 5 children. The patient smokes 3-4 cigarettes/day. Trying to quit. Does not drink alcohol. The patient is retired. She does get social security. PHYSICAL EXAMINATION:  There were no vitals taken for this visit. General: No acute distress. Eyes: Sclera anicteric. ENT: No oral lesions. Thyroid normal.  Nodes: No adenopathy. Skin: No spider angiomata. No jaundice. No palmar erythema. Respiratory: Lungs clear to auscultation. Cardiovascular: Regular heart rate. No murmurs. No JVD. Abdomen: Soft non-tender. Liver size normal to percussion/palpation. Spleen not palpable. No obvious ascites. Extremities: No edema. No muscle wasting. No gross arthritic changes. Neurologic: Alert and oriented. Cranial nerves grossly intact. No asterixis.     LABORATORY STUDIES:  Liver [place] of [address] & Units 8/26/2020 11/6/2019   WBC 3.4 - 10.8 x10E3/uL 8.5 ANC 1.4 - 7.0 x10E3/uL 5.4    HGB 11.1 - 15.9 g/dL 14.2    HGB 12 - 16 G/DL     HGB (iSTAT) 12 - 16 G/DL      - 450 x10E3/uL 196    INR 0.8 - 1.2 1.0    AST 0 - 40 IU/L 27 21   ALT 0 - 32 IU/L 14 22   Alk Phos 39 - 117 IU/L 73 70   Bili, Total 0.0 - 1.2 mg/dL 0.6 0.4   Bili, Direct 0.00 - 0.40 mg/dL 0.17    Albumin 3.8 - 4.8 g/dL 4.7 3.9   BUN 8 - 27 mg/dL 15 17   BUN (iSTAT) 7 - 18 MG/DL     Creat 0.57 - 1.00 mg/dL 1.11 (H) 1.04   Creat (iSTAT) 0.6 - 1.3 MG/DL     Na 134 - 144 mmol/L 144 142   K 3.5 - 5.2 mmol/L 3.7 3.5   Cl 96 - 106 mmol/L 105 108   CO2 20 - 29 mmol/L 25 31   Glucose 65 - 99 mg/dL 80 99   Ammonia 11 - 32 UMOL/L       Cancer Screening Latest Ref Rng & Units 8/26/2020   AFP, Serum 0.0 - 8.0 ng/mL 5.4   AFP-L3% 0.0 - 9.9 % Comment   CA 19-9 0 - 35 U/mL 14   CEA ng/mL    CEA 0.0 - 4.7 ng/mL 3.3     Cancer Screening Latest Ref Rng & Units 5/30/2019 6/8/2018   AFP, Serum 0.0 - 8.0 ng/mL 5.2  5.7   AFP-L3% 0.0 - 9.9 % 5.8  Comment   CA 19-9 0 - 35 U/mL 15     CEA ng/mL 1.7     CEA 0.0 - 4.7 ng/mL        SEROLOGIES:  Serologies Latest Ref Rng & Units 2/17/2017   Hep A Ab, Total Negative Negative   Hep B Surface Ag Negative Negative   Hep B Core Ab, Total Negative Positive (A)   Hep B Surface AB QL  Reactive   Hep C Genotype  1a   HCV RT-PCR, Quant IU/mL [id number]     LIVER HISTOLOGY:  02/2016 Liver biopsy POORLY DIFFERENTIATED CHOLANGIOCARCINOMA. Positive for cirrhosis. ENDOSCOPIC PROCEDURES:  03/2017. EGD by Dr. Tino Skaggs. Esophagus is normal.  Pathology is negative. Repeat in 03/2019.   02/2021. EGD by . Esophagus is normal.  Repeat in 3 years. RADIOLOGY:  02/2016 MRI. Inferior right hepatic lobe mass with imaging features that are not entirely specific. This is a hypovascular lesion with intrahepatic cholangiocarcinoma and hypovascular metastasis as primary differential possibilities. This lesion was biopsied previously on 1/28/2016.   06/2016.   MRI w/ MRCP w/wo contrast. Stable mass in the inferior right hepatic lobe. Nataliia hepatis and right cardiophrenic lymph nodes are also unchanged. No new findings to suggest progressive/metastatic disease. 02/2017. Abdominal MRI w/wo contrast.  Surgically truncated inferior margin of the right liver lobe which was the site of the tumor. No residual or new enhancing mass lesion. 08/2017. Abdominal MRI w/wo contrast.  Status post partial right hepatectomy. No recurrent or new liver nodules or masses identified. 05/2018. Abdominal MRI w/wo contrast.  No suspicious hepatic lesion for cholangiocarcinoma recurrence. 08/2019. Abdominal MRI w/ wo contrast. Stable exam. No liver mass or other no diagnostic evidence of  cholangiocarcinoma identified. 09/2020. Ultrasound of the liver. The liver demonstrates normal echogenicity and echotexture. No focal  lesion appreciated. OTHER TESTING:  Not available or performed    FOLLOW-UP AFTER VIRTUAL VISIT:  Pursuant to the emergency declaration under the [address] [place] [address] waiver authority and the [employer] and [employer] Act, this Virtual  Visit was conducted, with the patient's (and/or their legal guardian's) consent, to reduce the patient's risk of exposure to COVID-19 and provide necessary medical care. Services were provided through a video synchronous discussion virtually to substitute for an in-person clinic visit. The patient was located in their home. The provider was located in the [place] [address] office. All of the issues listed above in the Assessment and Plan were discussed with the patient. All questions were answered. The patient expressed a clear understanding of the above. Because of the COVID-19 epidemic a follow-up appointment will be performed via TeleHealth in 6 months for routine monitoring.     SIMA Brown  [facility]  [id number] [facility] [address], [address] [place] [address]   [phone]

## 2021-04-26 ENCOUNTER — VIRTUAL VISIT (OUTPATIENT)
Dept: FAMILY MEDICINE CLINIC | Age: 66
End: 2021-04-26

## 2021-05-03 ENCOUNTER — HOSPITAL ENCOUNTER (OUTPATIENT)
Dept: ULTRASOUND IMAGING | Age: 66
Discharge: HOME | End: 2021-05-03
Payer: MEDICAID

## 2021-05-03 DIAGNOSIS — K74.60 CIRRHOSIS OF LIVER WITHOUT ASCITES, UNSPECIFIED HEPATIC CIRRHOSIS TYPE (HCC): ICD-10-CM

## 2021-05-03 PROCEDURE — 76705 ECHO EXAM OF ABDOMEN: CPT

## 2021-05-13 RX ORDER — LOSARTAN POTASSIUM AND HYDROCHLOROTHIAZIDE 25; 100 MG/1; MG/1
1 TABLET ORAL DAILY
Qty: 90 TAB | Refills: 3 | Status: SHIPPED | OUTPATIENT
Start: 2021-05-13

## 2021-05-13 RX ORDER — CLONIDINE HYDROCHLORIDE 0.1 MG/1
0.1 TABLET ORAL
Qty: 90 TAB | Refills: 3 | Status: SHIPPED | OUTPATIENT
Start: 2021-05-13

## 2021-05-13 RX ORDER — QUETIAPINE FUMARATE 25 MG/1
25 TABLET, FILM COATED ORAL
Qty: 90 TAB | Refills: 1 | Status: SHIPPED | OUTPATIENT
Start: 2021-05-13 | End: 2021-11-26

## 2021-05-13 RX ORDER — LIDOCAINE 50 MG/G
PATCH TOPICAL
Qty: 30 PATCH | Refills: 0 | Status: SHIPPED | OUTPATIENT
Start: 2021-05-13

## 2021-05-13 RX ORDER — METOPROLOL TARTRATE 100 MG/1
100 TABLET ORAL DAILY
Qty: 90 TAB | Refills: 3 | Status: SHIPPED | OUTPATIENT
Start: 2021-05-13

## 2021-05-13 RX ORDER — BUSPIRONE HYDROCHLORIDE 30 MG/1
30 TABLET ORAL 2 TIMES DAILY
Qty: 60 TAB | Refills: 5 | Status: SHIPPED | OUTPATIENT
Start: 2021-05-13 | End: 2022-01-20 | Stop reason: SDUPTHER

## 2021-05-14 ENCOUNTER — VIRTUAL VISIT (OUTPATIENT)
Dept: FAMILY MEDICINE CLINIC | Age: 66
End: 2021-05-14
Payer: MEDICAID

## 2021-05-14 DIAGNOSIS — F41.9 ANXIETY AND DEPRESSION: ICD-10-CM

## 2021-05-14 DIAGNOSIS — I10 ESSENTIAL HYPERTENSION: ICD-10-CM

## 2021-05-14 DIAGNOSIS — F32.A ANXIETY AND DEPRESSION: ICD-10-CM

## 2021-05-14 PROCEDURE — 99214 OFFICE O/P EST MOD 30 MIN: CPT | Performed by: FAMILY MEDICINE

## 2021-05-22 NOTE — PATIENT INSTRUCTIONS
Learning About Depression Screening for Your Teen What is depression screening? Depression screening is a way to see if your teen has depression symptoms. It may be done by a doctor or counselor. It's often part of a routine checkup. That's because your teen's mental health is just as important as their physical health. Depression is a medical illness. It affects how your teen feels, thinks, and acts. Your teen may: 
· Have less energy. · Lose interest in daily activities. · Feel sad and grouchy for a long time. Depression is very common. It affects people of all ages. Many things can trigger depression. Some teens become depressed after a traumatic event or because they have a chronic illness. The death of a loved one, a breakup, being bullied, or having changes in brain chemicals may lead to it. Depression can run in families. Most experts believe that a mix of inherited genes and stressful life events can cause it. What happens during screening? Your teen may be asked to fill out a form about their depression symptoms. The doctor and your teen will talk about the answers. The doctor may ask you or your teen more questions to learn more about how your teen thinks, acts, and feels. What happens after screening? If your teen has signs of depression, the doctor will talk to you about your options. Doctors usually treat depression with medicines or counseling. Often, combining the two works best. Many people don't get help because they think that they'll get over the depression on their own. But people with depression may not get better unless they get treatment. Some people say they felt embarrassed or ashamed about having depression. But depression isn't a sign of personal weakness. It's not a character flaw. A person who is depressed isn't \"crazy. \" It's caused by changes in the brain. A serious symptom of depression is thinking about death or suicide.  If your teen talks about this or about feeling hopeless, get help right away. It's important to know that depression can be treated. The first step toward feeling better is often just seeing that the problem exists. Follow-up care is a key part of your child's treatment and safety. Be sure to make and go to all appointments, and call your doctor if your child is having problems. Ask your doctor when you can expect to have your child's test results. Where can you learn more? Go to http://www.gray.com/ Enter D250 in the search box to learn more about \"Learning About Depression Screening for Your Teen. \" Current as of: September 23, 2020               Content Version: 12.8 © 0197-1816 Healthwise, [employer]orporated. Care instructions adapted under license by [employer] (which disclaims liability or warranty for this information). If you have questions about a medical condition or this instruction, always ask your healthcare professional. [address] any warranty or liability for your use of this information.

## 2021-05-22 NOTE — PROGRESS NOTES
Jaleesa Deluca is a 77 y.o. female who was seen by synchronous (real-time) audio-video technology on 5/14/2021 for Hypertension and Anxiety        Assessment & Plan:   Diagnoses and all orders for this visit:    1. Essential hypertension  -     losartan-hydroCHLOROthiazide (HYZAAR) 100-25 mg per tablet; Take 1 Tab by mouth daily. -     cloNIDine HCL (CATAPRES) 0.1 mg tablet; Take 1 Tab by mouth nightly. -     metoprolol tartrate (LOPRESSOR) 100 mg IR tablet; Take 1 Tab by mouth daily. 2. Anxiety and depression  -     busPIRone (BUSPAR) 30 mg tablet; Take 1 Tab by mouth two (2) times a day. Other orders  -     QUEtiapine (SEROqueL) 25 mg tablet; Take 1 Tab by mouth nightly. -     lidocaine (LIDODERM) 5 %; apply 1 patch to the affected area daily. Leave on for 12 hours and then off for 12 hours. As above  Stable  Refilled medications as ordered  Counseling recommended  Follow-up and Dispositions    · Return in about 4 months (around 9/14/2021), or if symptoms worsen or fail to improve, for well exam.       This has been fully explained to the patient, who indicates understanding. Patient actually connected on the virtual platform on May 13, 2021. This is when this visit was done. 712  Subjective:   Patient is in need of refills. She has a history of hypertension. She also has a history of anxiety and depression. She has her usual complaints of anxiety and depression. She does overall feel better with regards to her depression. She is considering counseling. Prior to Admission medications    Medication Sig Start Date End Date Taking? Authorizing Provider   QUEtiapine (SEROqueL) 25 mg tablet Take 1 Tab by mouth nightly. 5/13/21  Yes Rc Arango MD   losartan-hydroCHLOROthiazide [facility]) 100-25 mg per tablet Take 1 Tab by mouth daily. 5/13/21  Yes Rc Arango MD   busPIRone (BUSPAR) 30 mg tablet Take 1 Tab by mouth two (2) times a day.  5/13/21  Yes Rc Arango MD cloNIDine HCL (CATAPRES) 0.1 mg tablet Take 1 Tab by mouth nightly. 5/13/21  Yes Amparo Lynch MD   metoprolol tartrate (LOPRESSOR) 100 mg IR tablet Take 1 Tab by mouth daily. 5/13/21  Yes Amparo Lynch MD   lidocaine (LIDODERM) 5 % apply 1 patch to the affected area daily. Leave on for 12 hours and then off for 12 hours. 5/13/21  Yes Amparo Lynch MD         ROS as per HPI    Objective:   No flowsheet data found. General: alert, cooperative, no distress   Mental  status: normal mood, behavior, speech, dress, motor activity, and thought processes, able to follow commands   HENT: NCAT   Neck: no visualized mass   Resp: no respiratory distress   Neuro: no gross deficits   Skin: no discoloration or lesions of concern on visible areas   Psychiatric: normal affect, consistent with stated mood, no evidence of hallucinations     Additional exam findings: None      We discussed the expected course, resolution and complications of the diagnosis(es) in detail. Medication risks, benefits, costs, interactions, and alternatives were discussed as indicated. I advised her to contact the office if her condition worsens, changes or fails to improve as anticipated. She expressed understanding with the diagnosis(es) and plan. Ernie Duran, was evaluated through a synchronous (real-time) audio-video encounter. The patient (or guardian if applicable) is aware that this is a billable service. Verbal consent to proceed has been obtained within the past 12 months. The visit was conducted pursuant to the emergency declaration under the [facility], [address] [place] [address] authority and the Alexey Loffles and [employer] General Act. Patient identification was verified, and a caregiver was present when appropriate. The patient was located in a state where the provider was credentialed to provide care.     Nikita Kelley MD

## 2021-09-28 ENCOUNTER — TELEPHONE (OUTPATIENT)
Dept: FAMILY MEDICINE CLINIC | Age: 66
End: 2021-09-28

## 2021-09-28 NOTE — TELEPHONE ENCOUNTER
Patient was contacted to r/s appt due to provider being out of the office. However patient is being seen by another provider.  Left message for patient to contact office to confirm details

## 2021-11-26 RX ORDER — QUETIAPINE FUMARATE 25 MG/1
TABLET, FILM COATED ORAL
Qty: 90 TABLET | Refills: 1 | Status: SHIPPED | OUTPATIENT
Start: 2021-11-26

## 2022-01-10 ENCOUNTER — NURSE TRIAGE (OUTPATIENT)
Dept: OTHER | Facility: CLINIC | Age: 67
End: 2022-01-10

## 2022-01-10 NOTE — TELEPHONE ENCOUNTER
Received call from [facility] stating that the patient is having increased anxiety with SI since 01/06/2022. Patient disconnected the call prior to call being transferred. This writer attempted to return call to patient x 2. Upon first call, the patient answered stating, \"I can't talk to anyone right now\" and disconnected the call. Patient did not answer the 2nd attempt and unable to leave  as the mailbox is full and not accepting any messages at this time. Could you please follow up with the patient? Attempted to call the office, but received no answer. Thank you. Received call from Ellis Salazar at [facility] with Red Flag Complaint. Attention Provider: Thank you for allowing me to participate in the care of your patient. The patient was connected to triage in response to information provided to the [facility]. Please do not respond through this encounter as the response is not directed to a shared pool.     Reason for Disposition   Caller has cancelled the call before the first contact    Protocols used: NO CONTACT OR DUPLICATE CONTACT CALL-ADULT-AH

## 2022-01-12 DIAGNOSIS — I10 ESSENTIAL HYPERTENSION: ICD-10-CM

## 2022-01-12 RX ORDER — LOSARTAN POTASSIUM AND HYDROCHLOROTHIAZIDE 25; 100 MG/1; MG/1
1 TABLET ORAL DAILY
Qty: 90 TABLET | Refills: 3 | Status: CANCELLED | OUTPATIENT
Start: 2022-01-12

## 2022-01-13 NOTE — TELEPHONE ENCOUNTER
Per pharmacy, Losartan/HCTZ combo and Losartan 100mg is on backorder. I have pended Losartan 50mg and HCTZ as an available alternative. Please edit as necessary and sign if appropriate    Requested Prescriptions     Pending Prescriptions Disp Refills    losartan (COZAAR) 50 mg tablet 180 Tablet 1     Sig: Take 2 Tablets by mouth daily.  hydroCHLOROthiazide (HYDRODIURIL) 25 mg tablet 90 Tablet 1     Sig: Take 1 Tablet by mouth daily.

## 2022-01-15 RX ORDER — LOSARTAN POTASSIUM 50 MG/1
100 TABLET ORAL DAILY
Qty: 180 TABLET | Refills: 1 | Status: SHIPPED | OUTPATIENT
Start: 2022-01-15

## 2022-01-15 RX ORDER — HYDROCHLOROTHIAZIDE 25 MG/1
25 TABLET ORAL DAILY
Qty: 90 TABLET | Refills: 1 | Status: SHIPPED | OUTPATIENT
Start: 2022-01-15

## 2022-01-20 ENCOUNTER — OFFICE VISIT (OUTPATIENT)
Dept: FAMILY MEDICINE CLINIC | Age: 67
End: 2022-01-20

## 2022-01-20 VITALS — HEIGHT: 62 IN | BODY MASS INDEX: 25.61 KG/M2

## 2022-01-20 DIAGNOSIS — F32.A ANXIETY AND DEPRESSION: ICD-10-CM

## 2022-01-20 DIAGNOSIS — F41.9 ANXIETY AND DEPRESSION: ICD-10-CM

## 2022-01-20 NOTE — PROGRESS NOTES
1. \"Have you been to the ER, urgent care clinic since your last visit? Hospitalized since your last visit? \"2. \"Have you seen or consulted any other health care providers outside of the [address] [place] [address] since your last visit? \"3. For patients aged 39-70: Has the patient had a colonoscopy / FIT/ Cologuard? If the patient is female:    4. For patients aged 41-77: Has the patient had a mammogram within the past 2 years? 5. For patients aged 21-65: Has the patient had a pap smear?

## 2022-01-20 NOTE — TELEPHONE ENCOUNTER
----- Message from [facility] sent at 1/19/2022  5:03 PM EST -----  Subject: Refill Request    QUESTIONS  Name of Medication? busPIRone (BUSPAR) 30 mg tablet  Patient-reported dosage and instructions? 30mg  How many days do you have left? 0  Preferred Pharmacy? [facility] phone number (if available)? [phone]  ---------------------------------------------------------------------------  --------------,  Name of Medication? losartan-hydroCHLOROthiazide (HYZAAR) 100-25 mg per   tablet  Patient-reported dosage and instructions? 100-25 mg  How many days do you have left? 0  Preferred Pharmacy? [facility] phone number (if available)? [phone]  ---------------------------------------------------------------------------  --------------,  Name of Medication? hydroCHLOROthiazide (HYDRODIURIL) 25 mg tablet  Patient-reported dosage and instructions? 25 mg  How many days do you have left? 0  Preferred Pharmacy? [facility] phone number (if available)? [phone]  ---------------------------------------------------------------------------  --------------  CALL BACK INFO  What is the best way for the office to contact you? OK to leave message on   voicemail  Preferred Call Back Phone Number?  [phone]
Losartan and HCTZ were sent on 1/15/22 due to unavailability of the combo pill. Last Visit: today with MD Leontine Moritz  Next Appointment: none  Previous Refill Encounter(s): 5/13/21 #60 with 5 refills    Requested Prescriptions     Pending Prescriptions Disp Refills    busPIRone (BUSPAR) 30 mg tablet 180 Tablet 1     Sig: Take 1 Tablet by mouth two (2) times a day.
ambulatory

## 2022-01-25 RX ORDER — BUSPIRONE HYDROCHLORIDE 30 MG/1
30 TABLET ORAL 2 TIMES DAILY
Qty: 180 TABLET | Refills: 1 | Status: SHIPPED | OUTPATIENT
Start: 2022-01-25

## 2022-01-29 NOTE — PROGRESS NOTES
This encounter was created in error - please disregard. This encounter was created in error - please disregard.

## 2022-03-15 ENCOUNTER — TELEPHONE (OUTPATIENT)
Dept: FAMILY MEDICINE CLINIC | Age: 67
End: 2022-03-15

## 2022-03-15 NOTE — TELEPHONE ENCOUNTER
Patient had a appointment scheduled for 09:40AM arrived 10:02AM. Informed her she was outside of the nika period and offered to reschedule her for the next available - pt declined to reschedule. She became upset and stated she knew she was late but still should be seen. Informed her I could send a message to confirm if appointment would need to be rescheduled. Patient began to complain that she shouldn't have to wait and wanted an immediate answer. Confirmed with clinical due to patient arriving over 20 mins after her scheduled time she would need to reschedule. Offered to reschedule the patient 2x - she declined again. Patient demanded medications be filled since she could not be seen. Informed her the request would be documented and sent to the provider. She immediately began to fuss and stated her medication \"better be filled today\" and repeatedly stated she would find a new pcp.

## 2022-03-18 RX ORDER — QUETIAPINE FUMARATE 25 MG/1
25 TABLET, FILM COATED ORAL
Qty: 90 TABLET | Refills: 1 | Status: CANCELLED | OUTPATIENT
Start: 2022-03-18

## 2022-03-18 RX ORDER — LOSARTAN POTASSIUM 50 MG/1
100 TABLET ORAL DAILY
Qty: 180 TABLET | Refills: 1 | Status: CANCELLED | OUTPATIENT
Start: 2022-03-18

## 2022-03-18 RX ORDER — HYDROCHLOROTHIAZIDE 25 MG/1
25 TABLET ORAL DAILY
Qty: 90 TABLET | Refills: 1 | Status: CANCELLED | OUTPATIENT
Start: 2022-03-18

## 2022-03-18 NOTE — TELEPHONE ENCOUNTER
----- Message from Melissa Home sent at 3/18/2022 10:49 AM EDT -----  Subject: Refill Request    QUESTIONS  Name of Medication? hydroCHLOROthiazide (HYDRODIURIL) 25 mg tablet  Patient-reported dosage and instructions? once daily  How many days do you have left? 0  Preferred Pharmacy? [facility] phone number (if available)? [phone]  Additional Information for Provider? Pt says she needs all of her   medications refilled. Pt scheduled an appt for [facility] available on   04/26/22  ---------------------------------------------------------------------------  --------------,  Name of Medication? losartan (COZAAR) 50 mg tablet  Patient-reported dosage and instructions? once daily  How many days do you have left? 0  Preferred Pharmacy? [address] [facility] phone number (if available)? [phone]  ---------------------------------------------------------------------------  --------------,  Name of Medication? QUEtiapine (SEROquel) 25 mg tablet  Patient-reported dosage and instructions? Once at bedtime. How many days do you have left? 0  Preferred Pharmacy? [facility] phone number (if available)? [phone]  Additional Information for Provider? Pt has appt. scheduled 04/26/22 and   says she may needs refills on other medications before then.  ---------------------------------------------------------------------------  --------------  CALL BACK INFO  What is the best way for the office to contact you? OK to leave message on   voicemail  Preferred Call Back Phone Number?  [phone]

## 2022-03-18 NOTE — TELEPHONE ENCOUNTER
Patient still has refills at the pharmacy for Cozaar, HCTZ and Seroquel. I contacted the pharmacy to confirm and requested they refill it for the patient. No further action needed.

## 2024-04-12 NOTE — ACP (ADVANCE CARE PLANNING)
Do you have an Advanced Directive? NO    Would you like information on Advanced Directives? NO      Was information provided?  NO
Home

## 2025-01-19 NOTE — LETTER
2/28/2019 Ms. Maybelle Pallas [address] [address] [address] [address] Dear Ms. Ruthann Marshall find it necessary to inform you that I must withdraw my professional commitment to you as a primary care provider as a result of your refusal to provide your medical history necessary to establish care and your uncooperative/inappropriate behavior shown while in the practice, which has resulted in a non-therapeutic relationship. It is essential that you continue to receive medical care for your condition. Therefore, I recommend you make immediate arrangements with another primary care provider to provide the needed care. For emergency medical services, please go to the nearest emergency room for treatment. Enclosed is a form authorizing me to release a copy of your medical records to your new treating primary care provider. I will forward your records promptly upon receipt of this form, signed by you, with completed name and address of the physician to receive your records. If you have any questions regarding the contents of this letter, you may reach me at my office during normal business hours. Respectfully, 
 
  
SAGE Flores
room air

## (undated) DEVICE — ENDO CARRY-ON PROCEDURE KIT INCLUDES ENZYMATIC SPONGE, GAUZE, BIOHAZARD LABEL, TRAY, LUBRICANT, DIRTY SCOPE LABEL, WATER LABEL, TRAY, DRAWSTRING PAD, AND DEFENDO 4-PIECE KIT.: Brand: ENDO CARRY-ON PROCEDURE KIT

## (undated) DEVICE — MEDI-VAC NON-CONDUCTIVE SUCTION TUBING: Brand: CARDINAL HEALTH

## (undated) DEVICE — KENDALL RADIOLUCENT FOAM MONITORING ELECTRODE RECTANGULAR SHAPE: Brand: KENDALL

## (undated) DEVICE — TRAP SPEC COLL POLYP POLYSTYR --

## (undated) DEVICE — MOUTHPIECE ENDOSCP 20X27MM --

## (undated) DEVICE — FORCEPS BX CAP 240CM L RAD JAW 4

## (undated) DEVICE — MAJ-1414 SINGLE USE ADPATER BIOPSY VALV: Brand: SINGLE USE ADAPTOR BIOPSY VALVE

## (undated) DEVICE — SPONGE GZ W4XL4IN COT 12 PLY TYP VII WVN C FLD DSGN

## (undated) DEVICE — SINGLE PORT MANIFOLD: Brand: NEPTUNE 2